# Patient Record
Sex: FEMALE | Race: WHITE | Employment: OTHER | URBAN - METROPOLITAN AREA
[De-identification: names, ages, dates, MRNs, and addresses within clinical notes are randomized per-mention and may not be internally consistent; named-entity substitution may affect disease eponyms.]

---

## 2018-11-08 ENCOUNTER — HOSPITAL ENCOUNTER (OUTPATIENT)
Dept: CARDIAC CATH/INVASIVE PROCEDURES | Age: 78
Discharge: HOME | End: 2018-11-08
Attending: INTERNAL MEDICINE | Admitting: INTERNAL MEDICINE
Payer: MEDICARE

## 2018-11-08 VITALS
HEART RATE: 63 BPM | BODY MASS INDEX: 31.49 KG/M2 | RESPIRATION RATE: 18 BRPM | HEIGHT: 56 IN | TEMPERATURE: 98.2 F | DIASTOLIC BLOOD PRESSURE: 73 MMHG | SYSTOLIC BLOOD PRESSURE: 113 MMHG | WEIGHT: 140 LBS

## 2018-11-08 LAB
ANION GAP SERPL CALCULATED.3IONS-SCNC: 10 MEQ/L (ref 7–13)
BUN BLDV-MCNC: 11 MG/DL (ref 8–23)
CALCIUM SERPL-MCNC: 9.3 MG/DL (ref 8.6–10.2)
CHLORIDE BLD-SCNC: 101 MEQ/L (ref 98–107)
CO2: 26 MEQ/L (ref 22–29)
CREAT SERPL-MCNC: 0.62 MG/DL (ref 0.5–0.9)
EKG ATRIAL RATE: 312 BPM
EKG ATRIAL RATE: 64 BPM
EKG P AXIS: 45 DEGREES
EKG P-R INTERVAL: 262 MS
EKG Q-T INTERVAL: 380 MS
EKG Q-T INTERVAL: 462 MS
EKG QRS DURATION: 80 MS
EKG QRS DURATION: 84 MS
EKG QTC CALCULATION (BAZETT): 469 MS
EKG QTC CALCULATION (BAZETT): 476 MS
EKG R AXIS: -20 DEGREES
EKG R AXIS: -21 DEGREES
EKG T AXIS: -10 DEGREES
EKG T AXIS: -23 DEGREES
EKG VENTRICULAR RATE: 64 BPM
EKG VENTRICULAR RATE: 92 BPM
GFR AFRICAN AMERICAN: >60
GFR NON-AFRICAN AMERICAN: >60
GLUCOSE BLD-MCNC: 96 MG/DL (ref 74–109)
HCT VFR BLD CALC: 45 % (ref 37–47)
HEMOGLOBIN: 15.3 G/DL (ref 12–16)
MCH RBC QN AUTO: 32.1 PG (ref 27–31.3)
MCHC RBC AUTO-ENTMCNC: 34 % (ref 33–37)
MCV RBC AUTO: 94.5 FL (ref 82–100)
PDW BLD-RTO: 12.6 % (ref 11.5–14.5)
PLATELET # BLD: 162 K/UL (ref 130–400)
POTASSIUM REFLEX MAGNESIUM: 4 MEQ/L (ref 3.5–5.1)
RBC # BLD: 4.76 M/UL (ref 4.2–5.4)
SODIUM BLD-SCNC: 137 MEQ/L (ref 132–144)
WBC # BLD: 5.2 K/UL (ref 4.8–10.8)

## 2018-11-08 PROCEDURE — 92960 CARDIOVERSION ELECTRIC EXT: CPT | Performed by: INTERNAL MEDICINE

## 2018-11-08 PROCEDURE — 93005 ELECTROCARDIOGRAM TRACING: CPT

## 2018-11-08 PROCEDURE — 6360000002 HC RX W HCPCS

## 2018-11-08 PROCEDURE — 85027 COMPLETE CBC AUTOMATED: CPT

## 2018-11-08 PROCEDURE — 2580000003 HC RX 258: Performed by: INTERNAL MEDICINE

## 2018-11-08 PROCEDURE — 80048 BASIC METABOLIC PNL TOTAL CA: CPT

## 2018-11-08 PROCEDURE — 6360000002 HC RX W HCPCS: Performed by: INTERNAL MEDICINE

## 2018-11-08 PROCEDURE — 2580000003 HC RX 258

## 2018-11-08 RX ORDER — MIDAZOLAM HYDROCHLORIDE 1 MG/ML
INJECTION INTRAMUSCULAR; INTRAVENOUS
Status: COMPLETED | OUTPATIENT
Start: 2018-11-08 | End: 2018-11-08

## 2018-11-08 RX ORDER — FLUMAZENIL 0.1 MG/ML
INJECTION, SOLUTION INTRAVENOUS
Status: DISCONTINUED
Start: 2018-11-08 | End: 2018-11-08 | Stop reason: HOSPADM

## 2018-11-08 RX ORDER — FENTANYL CITRATE 50 UG/ML
INJECTION, SOLUTION INTRAMUSCULAR; INTRAVENOUS
Status: DISCONTINUED
Start: 2018-11-08 | End: 2018-11-08 | Stop reason: HOSPADM

## 2018-11-08 RX ORDER — ASCORBIC ACID 500 MG
500 TABLET ORAL DAILY
COMMUNITY

## 2018-11-08 RX ORDER — AMIODARONE HYDROCHLORIDE 200 MG/1
200 TABLET ORAL DAILY
Qty: 30 TABLET | Refills: 3 | Status: SHIPPED | OUTPATIENT
Start: 2018-11-08 | End: 2018-11-08

## 2018-11-08 RX ORDER — MIDAZOLAM HYDROCHLORIDE 1 MG/ML
INJECTION INTRAMUSCULAR; INTRAVENOUS
Status: DISCONTINUED
Start: 2018-11-08 | End: 2018-11-08 | Stop reason: HOSPADM

## 2018-11-08 RX ORDER — SODIUM CHLORIDE 0.9 % (FLUSH) 0.9 %
10 SYRINGE (ML) INJECTION PRN
Status: DISCONTINUED | OUTPATIENT
Start: 2018-11-08 | End: 2018-11-08 | Stop reason: HOSPADM

## 2018-11-08 RX ORDER — NALOXONE HYDROCHLORIDE 0.4 MG/ML
INJECTION, SOLUTION INTRAMUSCULAR; INTRAVENOUS; SUBCUTANEOUS
Status: DISCONTINUED
Start: 2018-11-08 | End: 2018-11-08 | Stop reason: HOSPADM

## 2018-11-08 RX ORDER — SODIUM CHLORIDE 9 MG/ML
INJECTION, SOLUTION INTRAVENOUS CONTINUOUS
Status: DISCONTINUED | OUTPATIENT
Start: 2018-11-08 | End: 2018-11-08 | Stop reason: HOSPADM

## 2018-11-08 RX ORDER — AMIODARONE HYDROCHLORIDE 200 MG/1
200 TABLET ORAL DAILY
Status: DISCONTINUED | OUTPATIENT
Start: 2018-11-08 | End: 2018-11-08 | Stop reason: HOSPADM

## 2018-11-08 RX ORDER — AMIODARONE HYDROCHLORIDE 200 MG/1
200 TABLET ORAL DAILY
Qty: 30 TABLET | Refills: 3 | Status: SHIPPED | OUTPATIENT
Start: 2018-11-08 | End: 2019-03-07

## 2018-11-08 RX ORDER — SODIUM CHLORIDE 0.9 % (FLUSH) 0.9 %
10 SYRINGE (ML) INJECTION EVERY 12 HOURS SCHEDULED
Status: DISCONTINUED | OUTPATIENT
Start: 2018-11-08 | End: 2018-11-08 | Stop reason: HOSPADM

## 2018-11-08 RX ADMIN — AMIODARONE HYDROCHLORIDE 150 MG: 50 INJECTION, SOLUTION INTRAVENOUS at 11:06

## 2018-11-08 RX ADMIN — MIDAZOLAM HYDROCHLORIDE 3 MG: 1 INJECTION, SOLUTION INTRAMUSCULAR; INTRAVENOUS at 11:09

## 2018-11-08 RX ADMIN — FENTANYL CITRATE 50 MCG: 50 INJECTION, SOLUTION INTRAMUSCULAR; INTRAVENOUS at 10:53

## 2018-11-08 RX ADMIN — MIDAZOLAM HYDROCHLORIDE 3 MG: 1 INJECTION, SOLUTION INTRAMUSCULAR; INTRAVENOUS at 10:53

## 2019-03-07 ENCOUNTER — OFFICE VISIT (OUTPATIENT)
Dept: INTERNAL MEDICINE | Age: 79
End: 2019-03-07
Payer: MEDICARE

## 2019-03-07 VITALS
SYSTOLIC BLOOD PRESSURE: 100 MMHG | BODY MASS INDEX: 31.85 KG/M2 | WEIGHT: 141.6 LBS | OXYGEN SATURATION: 96 % | HEART RATE: 74 BPM | HEIGHT: 56 IN | TEMPERATURE: 98.4 F | DIASTOLIC BLOOD PRESSURE: 70 MMHG

## 2019-03-07 DIAGNOSIS — J01.90 ACUTE SINUSITIS, RECURRENCE NOT SPECIFIED, UNSPECIFIED LOCATION: Primary | ICD-10-CM

## 2019-03-07 PROCEDURE — 1101F PT FALLS ASSESS-DOCD LE1/YR: CPT | Performed by: NURSE PRACTITIONER

## 2019-03-07 PROCEDURE — G8427 DOCREV CUR MEDS BY ELIG CLIN: HCPCS | Performed by: NURSE PRACTITIONER

## 2019-03-07 PROCEDURE — 4040F PNEUMOC VAC/ADMIN/RCVD: CPT | Performed by: NURSE PRACTITIONER

## 2019-03-07 PROCEDURE — G8484 FLU IMMUNIZE NO ADMIN: HCPCS | Performed by: NURSE PRACTITIONER

## 2019-03-07 PROCEDURE — 1123F ACP DISCUSS/DSCN MKR DOCD: CPT | Performed by: NURSE PRACTITIONER

## 2019-03-07 PROCEDURE — 1090F PRES/ABSN URINE INCON ASSESS: CPT | Performed by: NURSE PRACTITIONER

## 2019-03-07 PROCEDURE — 99213 OFFICE O/P EST LOW 20 MIN: CPT | Performed by: NURSE PRACTITIONER

## 2019-03-07 PROCEDURE — G8400 PT W/DXA NO RESULTS DOC: HCPCS | Performed by: NURSE PRACTITIONER

## 2019-03-07 PROCEDURE — 1036F TOBACCO NON-USER: CPT | Performed by: NURSE PRACTITIONER

## 2019-03-07 PROCEDURE — G8417 CALC BMI ABV UP PARAM F/U: HCPCS | Performed by: NURSE PRACTITIONER

## 2019-03-07 RX ORDER — AZITHROMYCIN 250 MG/1
250 TABLET, FILM COATED ORAL SEE ADMIN INSTRUCTIONS
Qty: 6 TABLET | Refills: 0 | Status: SHIPPED | OUTPATIENT
Start: 2019-03-07 | End: 2019-03-12

## 2019-03-07 ASSESSMENT — ENCOUNTER SYMPTOMS
GASTROINTESTINAL NEGATIVE: 1
COUGH: 1
SINUS PAIN: 1
EYE PAIN: 0
EYE ITCHING: 0
SORE THROAT: 0
WHEEZING: 0
RHINORRHEA: 0
SHORTNESS OF BREATH: 0
EYE DISCHARGE: 0

## 2019-06-21 ENCOUNTER — OFFICE VISIT (OUTPATIENT)
Dept: INTERNAL MEDICINE | Age: 79
End: 2019-06-21
Payer: MEDICARE

## 2019-06-21 VITALS
DIASTOLIC BLOOD PRESSURE: 64 MMHG | WEIGHT: 144.3 LBS | BODY MASS INDEX: 32.46 KG/M2 | TEMPERATURE: 98.3 F | OXYGEN SATURATION: 95 % | HEIGHT: 56 IN | HEART RATE: 98 BPM | SYSTOLIC BLOOD PRESSURE: 98 MMHG | RESPIRATION RATE: 18 BRPM

## 2019-06-21 DIAGNOSIS — H60.392 OTHER INFECTIVE ACUTE OTITIS EXTERNA OF LEFT EAR: ICD-10-CM

## 2019-06-21 DIAGNOSIS — H66.002 NON-RECURRENT ACUTE SUPPURATIVE OTITIS MEDIA OF LEFT EAR WITHOUT SPONTANEOUS RUPTURE OF TYMPANIC MEMBRANE: Primary | ICD-10-CM

## 2019-06-21 PROCEDURE — 99213 OFFICE O/P EST LOW 20 MIN: CPT | Performed by: NURSE PRACTITIONER

## 2019-06-21 RX ORDER — CEFDINIR 300 MG/1
300 CAPSULE ORAL 2 TIMES DAILY
Qty: 14 CAPSULE | Refills: 0 | Status: SHIPPED | OUTPATIENT
Start: 2019-06-21 | End: 2019-06-28

## 2019-06-21 RX ORDER — ACETIC ACID 20.65 MG/ML
4 SOLUTION AURICULAR (OTIC) 3 TIMES DAILY
Qty: 1 BOTTLE | Refills: 0 | Status: SHIPPED | OUTPATIENT
Start: 2019-06-21 | End: 2019-06-28

## 2019-06-21 ASSESSMENT — ENCOUNTER SYMPTOMS
FACIAL SWELLING: 0
VOMITING: 0
SHORTNESS OF BREATH: 0
ABDOMINAL PAIN: 0
COUGH: 1
WHEEZING: 0
SORE THROAT: 0
DIARRHEA: 0
NAUSEA: 1

## 2019-06-21 ASSESSMENT — PATIENT HEALTH QUESTIONNAIRE - PHQ9
SUM OF ALL RESPONSES TO PHQ9 QUESTIONS 1 & 2: 0
SUM OF ALL RESPONSES TO PHQ QUESTIONS 1-9: 0
2. FEELING DOWN, DEPRESSED OR HOPELESS: 0
1. LITTLE INTEREST OR PLEASURE IN DOING THINGS: 0
SUM OF ALL RESPONSES TO PHQ QUESTIONS 1-9: 0

## 2019-07-25 ENCOUNTER — OFFICE VISIT (OUTPATIENT)
Dept: INTERNAL MEDICINE | Age: 79
End: 2019-07-25
Payer: MEDICARE

## 2019-07-25 VITALS
HEIGHT: 56 IN | TEMPERATURE: 98 F | HEART RATE: 70 BPM | DIASTOLIC BLOOD PRESSURE: 70 MMHG | OXYGEN SATURATION: 97 % | SYSTOLIC BLOOD PRESSURE: 120 MMHG | BODY MASS INDEX: 32.44 KG/M2 | WEIGHT: 144.2 LBS

## 2019-07-25 DIAGNOSIS — R09.82 ALLERGIC RHINITIS WITH POSTNASAL DRIP: Primary | ICD-10-CM

## 2019-07-25 DIAGNOSIS — J30.9 ALLERGIC RHINITIS WITH POSTNASAL DRIP: Primary | ICD-10-CM

## 2019-07-25 PROCEDURE — 99213 OFFICE O/P EST LOW 20 MIN: CPT | Performed by: NURSE PRACTITIONER

## 2019-07-25 ASSESSMENT — ENCOUNTER SYMPTOMS
VOMITING: 0
NAUSEA: 0
SINUS PRESSURE: 0
SHORTNESS OF BREATH: 0
ABDOMINAL PAIN: 0
COUGH: 1
DIARRHEA: 0
RHINORRHEA: 1
SINUS PAIN: 0
SORE THROAT: 0
WHEEZING: 0

## 2019-07-25 NOTE — PROGRESS NOTES
not erythematous and not bulging. Nose: Mucosal edema (mild) and rhinorrhea present. No sinus tenderness. Right sinus exhibits no maxillary sinus tenderness and no frontal sinus tenderness. Left sinus exhibits no maxillary sinus tenderness and no frontal sinus tenderness. Mouth/Throat: Uvula is midline, oropharynx is clear and moist and mucous membranes are normal. No tonsillar exudate. Eyes: Conjunctivae are normal.   Neck: Normal range of motion. Cardiovascular: Normal rate, regular rhythm and normal heart sounds. Pulmonary/Chest: Effort normal and breath sounds normal.   Abdominal: Soft. Bowel sounds are normal.   Musculoskeletal: Normal range of motion. Lymphadenopathy:     She has no cervical adenopathy. Neurological: She is alert and oriented to person, place, and time. Skin: Skin is warm and dry. Psychiatric: She has a normal mood and affect. Assessment:       Diagnosis Orders   1. Allergic rhinitis with postnasal drip           Plan:      Physical exam negative for infection. Advised patient on the use of Claritin and Flonase to decrease sinus related symptoms and postnasal drip. Patient declined and stated she would like to continue irrigating her nasal passages with honey that she produces at home. Advised her that this practice could be irritating to her nasal passages and potentially the cause of her ongoing symptoms. Patient verbalized understanding and will schedule appointment to follow up with PCP. Return for follow up with PCP.       Sidney Mercado, STACY - NP

## 2020-01-22 ENCOUNTER — OFFICE VISIT (OUTPATIENT)
Dept: INTERNAL MEDICINE | Age: 80
End: 2020-01-22
Payer: MEDICARE

## 2020-01-22 VITALS
BODY MASS INDEX: 32.06 KG/M2 | SYSTOLIC BLOOD PRESSURE: 110 MMHG | TEMPERATURE: 98.2 F | HEART RATE: 93 BPM | DIASTOLIC BLOOD PRESSURE: 68 MMHG | OXYGEN SATURATION: 98 % | WEIGHT: 143 LBS

## 2020-01-22 PROBLEM — M54.50 LOW BACK PAIN: Status: ACTIVE | Noted: 2020-01-22

## 2020-01-22 PROBLEM — M41.9 SCOLIOSIS: Status: ACTIVE | Noted: 2020-01-22

## 2020-01-22 LAB
APPEARANCE FLUID: ABNORMAL
BILIRUBIN, POC: ABNORMAL
BLOOD URINE, POC: ABNORMAL
CLARITY, POC: ABNORMAL
COLOR, POC: ABNORMAL
GLUCOSE URINE, POC: ABNORMAL
KETONES, POC: ABNORMAL
LEUKOCYTE EST, POC: ABNORMAL
NITRITE, POC: ABNORMAL
PH, POC: 7.5
PROTEIN, POC: ABNORMAL
SPECIFIC GRAVITY, POC: 1.01
UROBILINOGEN, POC: ABNORMAL

## 2020-01-22 PROCEDURE — G8510 SCR DEP NEG, NO PLAN REQD: HCPCS | Performed by: NURSE PRACTITIONER

## 2020-01-22 PROCEDURE — G8427 DOCREV CUR MEDS BY ELIG CLIN: HCPCS | Performed by: NURSE PRACTITIONER

## 2020-01-22 PROCEDURE — 1036F TOBACCO NON-USER: CPT | Performed by: NURSE PRACTITIONER

## 2020-01-22 PROCEDURE — G8417 CALC BMI ABV UP PARAM F/U: HCPCS | Performed by: NURSE PRACTITIONER

## 2020-01-22 PROCEDURE — 4040F PNEUMOC VAC/ADMIN/RCVD: CPT | Performed by: NURSE PRACTITIONER

## 2020-01-22 PROCEDURE — 81002 URINALYSIS NONAUTO W/O SCOPE: CPT | Performed by: NURSE PRACTITIONER

## 2020-01-22 PROCEDURE — 99213 OFFICE O/P EST LOW 20 MIN: CPT | Performed by: NURSE PRACTITIONER

## 2020-01-22 PROCEDURE — 1123F ACP DISCUSS/DSCN MKR DOCD: CPT | Performed by: NURSE PRACTITIONER

## 2020-01-22 PROCEDURE — G8484 FLU IMMUNIZE NO ADMIN: HCPCS | Performed by: NURSE PRACTITIONER

## 2020-01-22 PROCEDURE — 1090F PRES/ABSN URINE INCON ASSESS: CPT | Performed by: NURSE PRACTITIONER

## 2020-01-22 PROCEDURE — G8400 PT W/DXA NO RESULTS DOC: HCPCS | Performed by: NURSE PRACTITIONER

## 2020-01-22 RX ORDER — CIPROFLOXACIN 250 MG/1
250 TABLET, FILM COATED ORAL 2 TIMES DAILY
Qty: 6 TABLET | Refills: 0 | Status: SHIPPED | OUTPATIENT
Start: 2020-01-22 | End: 2020-01-25

## 2020-01-22 ASSESSMENT — ENCOUNTER SYMPTOMS
NAUSEA: 0
VOMITING: 0

## 2020-01-22 ASSESSMENT — PATIENT HEALTH QUESTIONNAIRE - PHQ9: DEPRESSION UNABLE TO ASSESS: URGENT/EMERGENT SITUATION

## 2020-01-23 DIAGNOSIS — R30.0 DYSURIA: ICD-10-CM

## 2020-01-23 PROBLEM — I48.0 PAROXYSMAL ATRIAL FIBRILLATION (HCC): Status: ACTIVE | Noted: 2018-10-02

## 2020-01-23 ASSESSMENT — ENCOUNTER SYMPTOMS
COUGH: 0
ABDOMINAL PAIN: 0
SHORTNESS OF BREATH: 0
WHEEZING: 0

## 2020-01-23 NOTE — PATIENT INSTRUCTIONS
from front to back. When should you call for help? Call your doctor now or seek immediate medical care if:    · Symptoms such as fever, chills, nausea, or vomiting get worse or appear for the first time.     · You have new pain in your back just below your rib cage. This is called flank pain.     · There is new blood or pus in your urine.     · You have any problems with your antibiotic medicine.    Watch closely for changes in your health, and be sure to contact your doctor if:    · You are not getting better after taking an antibiotic for 2 days.     · Your symptoms go away but then come back. Where can you learn more? Go to https://ZyncdpeTabacus Initativeeb.NeurAxon. org and sign in to your [employer] account. Enter I099 in the [employer] box to learn more about \"Urinary Tract Infection in Women: Care Instructions. \"     If you do not have an account, please click on the \"Sign Up Now\" link. Current as of: August 21, 2019  Content Version: 12.3  © 4794-1565 Healthwise, Incorporated. Care instructions adapted under license by [facility] ([facility]). If you have questions about a medical condition or this instruction, always ask your healthcare professional. [address] any warranty or liability for your use of this information.

## 2020-01-23 NOTE — PROGRESS NOTES
Subjective:      Patient ID: Bentley Blount is a 78 y.o. female who presents today for:  Chief Complaint   Patient presents with    Dysuria     frequency, urgency, x 2 days       Urinary Frequency    This is a new problem. The current episode started yesterday. The problem occurs every urination. The problem has been unchanged. The patient is experiencing no pain. There has been no fever. There is no history of pyelonephritis. Associated symptoms include frequency and urgency. Pertinent negatives include no chills, discharge, flank pain, hematuria, hesitancy, nausea, possible pregnancy, sweats or vomiting. She has tried nothing for the symptoms. Her past medical history is significant for kidney stones. There is no history of recurrent UTIs. Past Medical History:   Diagnosis Date    Low back pain 1/22/2020    Osteoporosis     TMJ syndrome      Past Surgical History:   Procedure Laterality Date    BLADDER REPAIR      HERNIA REPAIR      Left groin    HYSTERECTOMY      Vaginal for prolapse    KIDNEY SURGERY       Family History   Problem Relation Age of Onset    Cancer Mother     Stroke Father     Heart Disease Sister     High Blood Pressure Sister      Allergies   Allergen Reactions    Latex     Amoxicillin-Pot Clavulanate Rash    Codeine Hives and Rash         Review of Systems   Constitutional: Negative for chills, fatigue and fever. Respiratory: Negative for cough, shortness of breath and wheezing. Cardiovascular: Negative for chest pain. Gastrointestinal: Negative for abdominal pain, nausea and vomiting. Genitourinary: Positive for frequency and urgency. Negative for dysuria, flank pain, hematuria and hesitancy. Musculoskeletal: Negative for arthralgias and myalgias. Objective:   /68   Pulse 93   Temp 98.2 °F (36.8 °C) (Oral)   Wt 143 lb (64.9 kg)   SpO2 98%   BMI 32.06 kg/m²     Physical Exam  Vitals signs reviewed.    Constitutional:       General: She is not in prescribed today, as well as treatment plan/rationale, follow-up care, and result expectations have been discussed with the patient. Expresses understanding and desires to proceed with the treatment plan. Discussed signs and symptoms which require immediate follow-up in ED/call to 911. Understanding verbalized. I have reviewed and updated the electronic medical record. Return if symptoms worsen or fail to improve, for follow up with PCP.       Carter Holland, STACY - NP

## 2020-01-25 LAB
ORGANISM: ABNORMAL
URINE CULTURE, ROUTINE: ABNORMAL

## 2021-03-11 ENCOUNTER — OFFICE VISIT (OUTPATIENT)
Dept: INTERNAL MEDICINE | Age: 81
End: 2021-03-11
Payer: MEDICARE

## 2021-03-11 VITALS
TEMPERATURE: 97.5 F | HEIGHT: 56 IN | SYSTOLIC BLOOD PRESSURE: 124 MMHG | BODY MASS INDEX: 32.53 KG/M2 | OXYGEN SATURATION: 100 % | WEIGHT: 144.6 LBS | RESPIRATION RATE: 12 BRPM | DIASTOLIC BLOOD PRESSURE: 80 MMHG | HEART RATE: 92 BPM

## 2021-03-11 DIAGNOSIS — H66.002 NON-RECURRENT ACUTE SUPPURATIVE OTITIS MEDIA OF LEFT EAR WITHOUT SPONTANEOUS RUPTURE OF TYMPANIC MEMBRANE: Primary | ICD-10-CM

## 2021-03-11 PROCEDURE — 4040F PNEUMOC VAC/ADMIN/RCVD: CPT | Performed by: NURSE PRACTITIONER

## 2021-03-11 PROCEDURE — G8484 FLU IMMUNIZE NO ADMIN: HCPCS | Performed by: NURSE PRACTITIONER

## 2021-03-11 PROCEDURE — 1123F ACP DISCUSS/DSCN MKR DOCD: CPT | Performed by: NURSE PRACTITIONER

## 2021-03-11 PROCEDURE — 1090F PRES/ABSN URINE INCON ASSESS: CPT | Performed by: NURSE PRACTITIONER

## 2021-03-11 PROCEDURE — G8427 DOCREV CUR MEDS BY ELIG CLIN: HCPCS | Performed by: NURSE PRACTITIONER

## 2021-03-11 PROCEDURE — G8400 PT W/DXA NO RESULTS DOC: HCPCS | Performed by: NURSE PRACTITIONER

## 2021-03-11 PROCEDURE — G8419 CALC BMI OUT NRM PARAM NOF/U: HCPCS | Performed by: NURSE PRACTITIONER

## 2021-03-11 PROCEDURE — 99213 OFFICE O/P EST LOW 20 MIN: CPT | Performed by: NURSE PRACTITIONER

## 2021-03-11 PROCEDURE — 1036F TOBACCO NON-USER: CPT | Performed by: NURSE PRACTITIONER

## 2021-03-11 RX ORDER — CEFDINIR 300 MG/1
300 CAPSULE ORAL 2 TIMES DAILY
Qty: 14 CAPSULE | Refills: 0 | Status: SHIPPED | OUTPATIENT
Start: 2021-03-11 | End: 2021-03-18

## 2021-03-11 SDOH — ECONOMIC STABILITY: TRANSPORTATION INSECURITY
IN THE PAST 12 MONTHS, HAS THE LACK OF TRANSPORTATION KEPT YOU FROM MEDICAL APPOINTMENTS OR FROM GETTING MEDICATIONS?: NOT ASKED

## 2021-03-11 SDOH — ECONOMIC STABILITY: FOOD INSECURITY: WITHIN THE PAST 12 MONTHS, THE FOOD YOU BOUGHT JUST DIDN'T LAST AND YOU DIDN'T HAVE MONEY TO GET MORE.: NEVER TRUE

## 2021-03-11 ASSESSMENT — PATIENT HEALTH QUESTIONNAIRE - PHQ9
2. FEELING DOWN, DEPRESSED OR HOPELESS: 0
SUM OF ALL RESPONSES TO PHQ9 QUESTIONS 1 & 2: 0
SUM OF ALL RESPONSES TO PHQ QUESTIONS 1-9: 0

## 2021-03-11 ASSESSMENT — ENCOUNTER SYMPTOMS
SINUS PRESSURE: 0
RHINORRHEA: 0
SINUS PAIN: 0
SHORTNESS OF BREATH: 0
COUGH: 0
WHEEZING: 0
SORE THROAT: 0

## 2021-03-11 NOTE — PATIENT INSTRUCTIONS
Patient Education        Ear Infection (Otitis Media): Care Instructions  Overview     An ear infection may start with a cold and affect the middle ear (otitis media). It can hurt a lot. Most ear infections clear up on their own in a couple of days and do not need antibiotics. Also, antibiotics do not work against viruses, which may be the cause of your infection. Regular doses of pain relievers are the best way to reduce your fever and help you feel better. Follow-up care is a key part of your treatment and safety. Be sure to make and go to all appointments, and call your doctor if you are having problems. It's also a good idea to know your test results and keep a list of the medicines you take. How can you care for yourself at home? · Take pain medicines exactly as directed. ? If the doctor gave you a prescription medicine for pain, take it as prescribed. ? If you are not taking a prescription pain medicine, take an over-the-counter medicine, such as acetaminophen (Tylenol), ibuprofen (Advil, Motrin), or naproxen (Aleve). Read and follow all instructions on the label. ? Do not take two or more pain medicines at the same time unless the doctor told you to. Many pain medicines have acetaminophen, which is Tylenol. Too much acetaminophen (Tylenol) can be harmful. · Plan to take a full dose of pain reliever before bedtime. Getting enough sleep will help you get better. · Try a warm, moist washcloth on the ear. It may help relieve pain. · If your doctor prescribed antibiotics, take them as directed. Do not stop taking them just because you feel better. You need to take the full course of antibiotics. When should you call for help? Call your doctor now or seek immediate medical care if:    · You have new or increasing ear pain.     · You have new or increasing pus or blood draining from your ear.     · You have a fever with a stiff neck or a severe headache.    Watch closely for changes in your health, and be sure to contact your doctor if:    · You have new or worse symptoms.     · You are not getting better after taking an antibiotic for 2 days. Where can you learn more? Go to https://NaniganspeAttention Sciences.Bilbus. org and sign in to your [employer] account. Enter O928 in the [facility] box to learn more about \"Ear Infection (Otitis Media): Care Instructions. \"     If you do not have an account, please click on the \"Sign Up Now\" link. Current as of: December 2, 2020               Content Version: 12.8  © 6789-0809 Healthwise, Incorporated. Care instructions adapted under license by [facility] ([facility]). If you have questions about a medical condition or this instruction, always ask your healthcare professional. [address] any warranty or liability for your use of this information.

## 2021-03-11 NOTE — PROGRESS NOTES
Subjective:      Patient ID: Claude Grumbles is a [de-identified] y.o. female who presents today for:  Chief Complaint   Patient presents with   [place]     left side, into left jaw, causing headache       Otalgia   There is pain in the left ear. This is a new problem. Episode onset: 4 days ago. The problem occurs constantly. The problem has been unchanged. There has been no fever. The pain is at a severity of 8/10. The pain is severe. Pertinent negatives include no coughing, ear discharge, headaches, hearing loss, rhinorrhea or sore throat. Treatments tried: \"homeopathic home remedies\" The treatment provided mild relief. There is no history of a chronic ear infection, hearing loss or a tympanostomy tube. Past Medical History:   Diagnosis Date    Low back pain 1/22/2020    Osteoporosis     TMJ syndrome      Past Surgical History:   Procedure Laterality Date    BLADDER REPAIR      HERNIA REPAIR      Left groin    HYSTERECTOMY      Vaginal for prolapse    KIDNEY SURGERY       Family History   Problem Relation Age of Onset    Cancer Mother     Stroke Father     Heart Disease Sister     High Blood Pressure Sister      Allergies   Allergen Reactions    Latex     Amoxicillin-Pot Clavulanate Rash    Codeine Hives and Rash         Review of Systems   Constitutional: Negative for chills, fatigue and fever. HENT: Positive for ear pain. Negative for congestion, ear discharge, hearing loss, postnasal drip, rhinorrhea, sinus pressure, sinus pain and sore throat. Respiratory: Negative for cough, shortness of breath and wheezing. Cardiovascular: Negative for chest pain. Neurological: Negative for headaches. Objective:   /80   Pulse 92   Temp 97.5 °F (36.4 °C) (Infrared)   Resp 12   Ht 4' 8\" (1.422 m)   Wt 144 lb 9.6 oz (65.6 kg)   SpO2 100%   BMI 32.42 kg/m²     Physical Exam  Vitals signs reviewed. Constitutional:       General: She is not in acute distress.      Appearance: She is well-developed. She is not ill-appearing. HENT:      Head: Normocephalic. Right Ear: Ear canal and external ear normal. Tympanic membrane is erythematous and bulging. Left Ear: Tympanic membrane, ear canal and external ear normal.   Neck:      Musculoskeletal: Normal range of motion. Cardiovascular:      Rate and Rhythm: Normal rate and regular rhythm. Heart sounds: Normal heart sounds. Pulmonary:      Effort: Pulmonary effort is normal. No respiratory distress. Breath sounds: Normal breath sounds. Musculoskeletal: Normal range of motion. Lymphadenopathy:      Cervical: No cervical adenopathy. Skin:     General: Skin is warm and dry. Neurological:      Mental Status: She is alert and oriented to person, place, and time. Assessment:       Diagnosis Orders   1. Non-recurrent acute suppurative otitis media of left ear without spontaneous rupture of tympanic membrane  cefdinir (OMNICEF) 300 MG capsule         Plan:      No orders of the defined types were placed in this encounter. Orders Placed This Encounter   Medications    cefdinir (OMNICEF) 300 MG capsule     Sig: Take 1 capsule by mouth 2 times daily for 7 days     Dispense:  14 capsule     Refill:  0     Reviewed usual course of illness and supportive measures for symptom management. Medication administration and side effects were discussed. Advised close monitoring and follow up for new or worsening symptoms. Patient verbalizes understanding and wishes to proceed with the treatment plan. I have reviewed and updated the electronic medical record. Return if symptoms worsen or fail to improve, for follow up with PCP.     STACY House NP

## 2023-03-07 LAB
ANION GAP IN SER/PLAS: 10 MMOL/L (ref 10–20)
ANION GAP SERPL CALCULATED.3IONS-SCNC: 10 MMOL/L (ref 10–20)
BICARBONATE: 30 MMOL/L (ref 21–32)
CALCIUM (MG/DL) IN SER/PLAS: 9.2 MG/DL (ref 8.6–10.3)
CALCIUM SERPL-MCNC: 9.2 MG/DL (ref 8.6–10.3)
CARBON DIOXIDE, TOTAL (MMOL/L) IN SER/PLAS: 30 MMOL/L (ref 21–32)
CHLORIDE (MMOL/L) IN SER/PLAS: 104 MMOL/L (ref 98–107)
CHLORIDE BLD-SCNC: 104 MMOL/L (ref 98–107)
CREAT SERPL-MCNC: 0.68 MG/DL (ref 0.5–1)
CREATININE (MG/DL) IN SER/PLAS: 0.68 MG/DL (ref 0.5–1.05)
EGFR FEMALE: 87 ML/MIN/1.73M2
ERYTHROCYTE DISTRIBUTION WIDTH (RATIO) BY AUTOMATED COUNT: 12.3 % (ref 11.5–14.5)
ERYTHROCYTE MEAN CORPUSCULAR HEMOGLOBIN CONCENTRATION (G/DL) BY AUTOMATED: 32.6 G/DL (ref 32–36)
ERYTHROCYTE MEAN CORPUSCULAR VOLUME (FL) BY AUTOMATED COUNT: 99 FL (ref 80–100)
ERYTHROCYTE [DISTWIDTH] IN BLOOD BY AUTOMATED COUNT: 12.3 % (ref 11.5–14)
ERYTHROCYTES (10*6/UL) IN BLOOD BY AUTOMATED COUNT: 4.41 X10E12/L (ref 4–5.2)
GFR FEMALE: 87 ML/MIN/1.73M2
GLUCOSE (MG/DL) IN SER/PLAS: 112 MG/DL (ref 74–99)
GLUCOSE: 112 MG/DL (ref 74–99)
HCT VFR BLD CALC: 43.6 % (ref 36–46)
HEMATOCRIT (%) IN BLOOD BY AUTOMATED COUNT: 43.6 % (ref 36–46)
HEMOGLOBIN (G/DL) IN BLOOD: 14.2 G/DL (ref 12–16)
HEMOGLOBIN: 14.2 G/DL (ref 12–16)
LEUKOCYTES (10*3/UL) IN BLOOD BY AUTOMATED COUNT: 7.2 X10E9/L (ref 4.4–11.3)
MCHC RBC AUTO-ENTMCNC: 32.6 G/DL (ref 32–36)
MCV RBC AUTO: 99 FL (ref 80–100)
PLATELET # BLD: 184 X10E9/L (ref 150–450)
PLATELETS (10*3/UL) IN BLOOD AUTOMATED COUNT: 184 X10E9/L (ref 150–450)
POTASSIUM (MMOL/L) IN SER/PLAS: 4.1 MMOL/L (ref 3.5–5.3)
POTASSIUM SERPL-SCNC: 4.1 MMOL/L (ref 3.5–5.3)
RBC # BLD: 4.41 X10E12/L (ref 4–5.2)
SODIUM (MMOL/L) IN SER/PLAS: 140 MMOL/L (ref 136–145)
SODIUM BLD-SCNC: 140 MMOL/L (ref 136–145)
UREA NITROGEN (MG/DL) IN SER/PLAS: 9 MG/DL (ref 6–23)
UREA NITROGEN: 9 MG/DL (ref 6–23)
WBC: 7.2 X10E9/L (ref 4.4–11.3)

## 2023-08-16 RX ORDER — ASCORBIC ACID 500 MG
1 TABLET ORAL DAILY
COMMUNITY
End: 2023-10-27 | Stop reason: ALTCHOICE

## 2023-08-16 RX ORDER — TIZANIDINE 4 MG/1
1 TABLET ORAL NIGHTLY PRN
COMMUNITY
End: 2023-10-27 | Stop reason: ALTCHOICE

## 2023-08-16 RX ORDER — ACETAMINOPHEN 325 MG/1
3 TABLET ORAL EVERY 8 HOURS
COMMUNITY
Start: 2021-12-14

## 2023-08-16 RX ORDER — APIXABAN 2.5 MG/1
1 TABLET, FILM COATED ORAL 2 TIMES DAILY
COMMUNITY
Start: 2022-09-28 | End: 2023-10-27 | Stop reason: SDUPTHER

## 2023-08-16 RX ORDER — METOPROLOL SUCCINATE 25 MG/1
0.5 TABLET, EXTENDED RELEASE ORAL 2 TIMES DAILY
COMMUNITY
Start: 2020-06-09 | End: 2023-11-01 | Stop reason: SDUPTHER

## 2023-08-16 RX ORDER — AMOXICILLIN 875 MG/1
1 TABLET, FILM COATED ORAL EVERY 12 HOURS
COMMUNITY
Start: 2022-06-28 | End: 2023-10-27 | Stop reason: ALTCHOICE

## 2023-08-18 PROBLEM — Z96.652 STATUS POST LEFT KNEE REPLACEMENT: Status: ACTIVE | Noted: 2023-08-18

## 2023-08-18 PROBLEM — M17.12 LEFT KNEE DJD: Status: ACTIVE | Noted: 2023-08-18

## 2023-08-18 PROBLEM — I49.9 IRREGULAR HEART BEAT: Status: ACTIVE | Noted: 2023-08-18

## 2023-08-18 PROBLEM — M54.2 NECK PAIN: Status: ACTIVE | Noted: 2023-08-18

## 2023-08-18 PROBLEM — M54.50 LUMBAR PAIN: Status: ACTIVE | Noted: 2023-08-18

## 2023-08-18 PROBLEM — K40.90 INGUINAL HERNIA: Status: ACTIVE | Noted: 2023-08-18

## 2023-08-18 PROBLEM — H60.92 OTITIS EXTERNA, LEFT: Status: ACTIVE | Noted: 2023-08-18

## 2023-08-18 PROBLEM — R11.0 NAUSEA IN ADULT: Status: ACTIVE | Noted: 2023-08-18

## 2023-08-18 PROBLEM — I48.91 ATRIAL FIBRILLATION (MULTI): Status: ACTIVE | Noted: 2023-08-18

## 2023-08-18 PROBLEM — M54.9 ACUTE MID BACK PAIN: Status: ACTIVE | Noted: 2023-08-18

## 2023-08-18 PROBLEM — M41.9 THORACIC SCOLIOSIS: Status: ACTIVE | Noted: 2023-08-18

## 2023-08-18 PROBLEM — I89.0 LYMPHEDEMA OF EXTREMITY: Status: ACTIVE | Noted: 2023-08-18

## 2023-08-18 PROBLEM — R53.83 FATIGUE: Status: ACTIVE | Noted: 2023-08-18

## 2023-08-18 PROBLEM — I48.21 PERMANENT ATRIAL FIBRILLATION (MULTI): Status: ACTIVE | Noted: 2023-08-18

## 2023-08-18 PROBLEM — Z79.01 ANTICOAGULANT LONG-TERM USE: Status: ACTIVE | Noted: 2023-08-18

## 2023-08-18 PROBLEM — R51.9 HEADACHE: Status: ACTIVE | Noted: 2023-08-18

## 2023-08-18 PROBLEM — R00.0 TACHYCARDIA: Status: ACTIVE | Noted: 2023-08-18

## 2023-08-18 PROBLEM — J32.9 SINUSITIS: Status: ACTIVE | Noted: 2023-08-18

## 2023-08-18 PROBLEM — I48.0 PAROXYSMAL ATRIAL FIBRILLATION (MULTI): Status: ACTIVE | Noted: 2023-08-18

## 2023-08-18 PROBLEM — M25.562 PAIN IN LEFT KNEE: Status: ACTIVE | Noted: 2023-08-18

## 2023-08-18 PROBLEM — N39.0 UTI (URINARY TRACT INFECTION): Status: ACTIVE | Noted: 2023-08-18

## 2023-08-18 PROBLEM — R06.02 SHORTNESS OF BREATH: Status: ACTIVE | Noted: 2023-08-18

## 2023-08-18 PROBLEM — E66.9 OBESE: Status: ACTIVE | Noted: 2023-08-18

## 2023-08-18 PROBLEM — M41.9 KYPHOSCOLIOSIS: Status: ACTIVE | Noted: 2023-08-18

## 2023-08-26 PROBLEM — E66.811 CLASS 1 OBESITY WITH BODY MASS INDEX (BMI) OF 31.0 TO 31.9 IN ADULT: Status: ACTIVE | Noted: 2023-08-26

## 2023-08-26 PROBLEM — I48.21 PERMANENT ATRIAL FIBRILLATION (MULTI): Status: RESOLVED | Noted: 2023-08-18 | Resolved: 2023-08-26

## 2023-08-26 PROBLEM — N39.0 UTI (URINARY TRACT INFECTION): Status: RESOLVED | Noted: 2023-08-18 | Resolved: 2023-08-26

## 2023-08-26 PROBLEM — E66.9 CLASS 1 OBESITY WITH BODY MASS INDEX (BMI) OF 31.0 TO 31.9 IN ADULT: Status: ACTIVE | Noted: 2023-08-26

## 2023-09-06 ENCOUNTER — LAB (OUTPATIENT)
Dept: LAB | Facility: LAB | Age: 83
End: 2023-09-06
Payer: MEDICARE

## 2023-09-06 ENCOUNTER — TELEPHONE (OUTPATIENT)
Dept: PRIMARY CARE | Facility: CLINIC | Age: 83
End: 2023-09-06
Payer: MEDICARE

## 2023-09-06 DIAGNOSIS — D50.9 IRON DEFICIENCY ANEMIA, UNSPECIFIED IRON DEFICIENCY ANEMIA TYPE: ICD-10-CM

## 2023-09-06 DIAGNOSIS — R53.83 FATIGUE, UNSPECIFIED TYPE: ICD-10-CM

## 2023-09-06 DIAGNOSIS — R35.0 INCREASED URINARY FREQUENCY: Primary | ICD-10-CM

## 2023-09-06 DIAGNOSIS — R35.0 INCREASED URINARY FREQUENCY: ICD-10-CM

## 2023-09-06 DIAGNOSIS — Z92.29 HX OF LONG TERM USE OF BLOOD THINNERS: ICD-10-CM

## 2023-09-06 DIAGNOSIS — R31.9 HEMATURIA, UNSPECIFIED TYPE: ICD-10-CM

## 2023-09-06 LAB
ALANINE AMINOTRANSFERASE (SGPT) (U/L) IN SER/PLAS: 12 U/L (ref 7–45)
ALBUMIN (G/DL) IN SER/PLAS: 4.5 G/DL (ref 3.4–5)
ALKALINE PHOSPHATASE (U/L) IN SER/PLAS: 64 U/L (ref 33–136)
ANION GAP IN SER/PLAS: 12 MMOL/L (ref 10–20)
APPEARANCE, URINE: CLEAR
ASPARTATE AMINOTRANSFERASE (SGOT) (U/L) IN SER/PLAS: 16 U/L (ref 9–39)
BASOPHILS (10*3/UL) IN BLOOD BY AUTOMATED COUNT: 0.04 X10E9/L (ref 0–0.1)
BASOPHILS/100 LEUKOCYTES IN BLOOD BY AUTOMATED COUNT: 0.6 % (ref 0–2)
BILIRUBIN TOTAL (MG/DL) IN SER/PLAS: 0.8 MG/DL (ref 0–1.2)
BILIRUBIN, URINE: NEGATIVE
BLOOD, URINE: NEGATIVE
CALCIUM (MG/DL) IN SER/PLAS: 9.4 MG/DL (ref 8.6–10.3)
CARBON DIOXIDE, TOTAL (MMOL/L) IN SER/PLAS: 30 MMOL/L (ref 21–32)
CHLORIDE (MMOL/L) IN SER/PLAS: 104 MMOL/L (ref 98–107)
COBALAMIN (VITAMIN B12) (PG/ML) IN SER/PLAS: 256 PG/ML (ref 211–911)
COLOR, URINE: YELLOW
CREATININE (MG/DL) IN SER/PLAS: 0.71 MG/DL (ref 0.5–1.05)
EOSINOPHILS (10*3/UL) IN BLOOD BY AUTOMATED COUNT: 0.17 X10E9/L (ref 0–0.4)
EOSINOPHILS/100 LEUKOCYTES IN BLOOD BY AUTOMATED COUNT: 2.4 % (ref 0–6)
ERYTHROCYTE DISTRIBUTION WIDTH (RATIO) BY AUTOMATED COUNT: 13 % (ref 11.5–14.5)
ERYTHROCYTE MEAN CORPUSCULAR HEMOGLOBIN CONCENTRATION (G/DL) BY AUTOMATED: 32.8 G/DL (ref 32–36)
ERYTHROCYTE MEAN CORPUSCULAR VOLUME (FL) BY AUTOMATED COUNT: 100 FL (ref 80–100)
ERYTHROCYTES (10*6/UL) IN BLOOD BY AUTOMATED COUNT: 4.38 X10E12/L (ref 4–5.2)
GFR FEMALE: 84 ML/MIN/1.73M2
GLUCOSE (MG/DL) IN SER/PLAS: 92 MG/DL (ref 74–99)
GLUCOSE, URINE: NEGATIVE MG/DL
HEMATOCRIT (%) IN BLOOD BY AUTOMATED COUNT: 43.6 % (ref 36–46)
HEMOGLOBIN (G/DL) IN BLOOD: 14.3 G/DL (ref 12–16)
IMMATURE GRANULOCYTES/100 LEUKOCYTES IN BLOOD BY AUTOMATED COUNT: 0.3 % (ref 0–0.9)
IRON (UG/DL) IN SER/PLAS: 126 UG/DL (ref 35–150)
IRON BINDING CAPACITY (UG/DL) IN SER/PLAS: 350 UG/DL (ref 240–445)
IRON SATURATION (%) IN SER/PLAS: 36 % (ref 25–45)
KETONES, URINE: NEGATIVE MG/DL
LEUKOCYTE ESTERASE, URINE: ABNORMAL
LEUKOCYTES (10*3/UL) IN BLOOD BY AUTOMATED COUNT: 7 X10E9/L (ref 4.4–11.3)
LYMPHOCYTES (10*3/UL) IN BLOOD BY AUTOMATED COUNT: 1.74 X10E9/L (ref 0.8–3)
LYMPHOCYTES/100 LEUKOCYTES IN BLOOD BY AUTOMATED COUNT: 25 % (ref 13–44)
MONOCYTES (10*3/UL) IN BLOOD BY AUTOMATED COUNT: 0.52 X10E9/L (ref 0.05–0.8)
MONOCYTES/100 LEUKOCYTES IN BLOOD BY AUTOMATED COUNT: 7.5 % (ref 2–10)
NEUTROPHILS (10*3/UL) IN BLOOD BY AUTOMATED COUNT: 4.48 X10E9/L (ref 1.6–5.5)
NEUTROPHILS/100 LEUKOCYTES IN BLOOD BY AUTOMATED COUNT: 64.2 % (ref 40–80)
NITRITE, URINE: NEGATIVE
PH, URINE: 7 (ref 5–8)
PLATELETS (10*3/UL) IN BLOOD AUTOMATED COUNT: 174 X10E9/L (ref 150–450)
POTASSIUM (MMOL/L) IN SER/PLAS: 3.8 MMOL/L (ref 3.5–5.3)
PROTEIN TOTAL: 7 G/DL (ref 6.4–8.2)
PROTEIN, URINE: NEGATIVE MG/DL
RBC, URINE: 1 /HPF (ref 0–5)
SODIUM (MMOL/L) IN SER/PLAS: 142 MMOL/L (ref 136–145)
SPECIFIC GRAVITY, URINE: 1.02 (ref 1–1.03)
SQUAMOUS EPITHELIAL CELLS, URINE: 4 /HPF
THYROTROPIN (MIU/L) IN SER/PLAS BY DETECTION LIMIT <= 0.05 MIU/L: 1.45 MIU/L (ref 0.44–3.98)
UREA NITROGEN (MG/DL) IN SER/PLAS: 10 MG/DL (ref 6–23)
UROBILINOGEN, URINE: 4 MG/DL (ref 0–1.9)
WBC, URINE: 4 /HPF (ref 0–5)

## 2023-09-06 PROCEDURE — 84436 ASSAY OF TOTAL THYROXINE: CPT

## 2023-09-06 PROCEDURE — 83540 ASSAY OF IRON: CPT

## 2023-09-06 PROCEDURE — 87086 URINE CULTURE/COLONY COUNT: CPT

## 2023-09-06 PROCEDURE — 83550 IRON BINDING TEST: CPT

## 2023-09-06 PROCEDURE — 81001 URINALYSIS AUTO W/SCOPE: CPT

## 2023-09-06 PROCEDURE — 36415 COLL VENOUS BLD VENIPUNCTURE: CPT

## 2023-09-06 PROCEDURE — 84443 ASSAY THYROID STIM HORMONE: CPT

## 2023-09-06 PROCEDURE — 82607 VITAMIN B-12: CPT

## 2023-09-06 PROCEDURE — 80053 COMPREHEN METABOLIC PANEL: CPT

## 2023-09-06 PROCEDURE — 85025 COMPLETE CBC W/AUTO DIFF WBC: CPT

## 2023-09-06 PROCEDURE — 82728 ASSAY OF FERRITIN: CPT

## 2023-09-06 NOTE — TELEPHONE ENCOUNTER
Daughter called and stated that Malathi has had blood in her urine and is very fatigue.  Please advise

## 2023-09-07 ENCOUNTER — TELEPHONE (OUTPATIENT)
Dept: PRIMARY CARE | Facility: CLINIC | Age: 83
End: 2023-09-07
Payer: MEDICARE

## 2023-09-07 DIAGNOSIS — N39.0 URINARY TRACT INFECTION WITHOUT HEMATURIA, SITE UNSPECIFIED: Primary | ICD-10-CM

## 2023-09-07 LAB
FERRITIN (UG/LL) IN SER/PLAS: 147 UG/L (ref 8–150)
THYROXINE (T4) (UG/DL) IN SER/PLAS: 8.8 UG/DL (ref 4.5–11.1)
URINE CULTURE: NORMAL

## 2023-09-07 RX ORDER — NITROFURANTOIN 25; 75 MG/1; MG/1
100 CAPSULE ORAL 2 TIMES DAILY
Qty: 14 CAPSULE | Refills: 0 | Status: SHIPPED | OUTPATIENT
Start: 2023-09-07 | End: 2023-09-14

## 2023-10-04 ENCOUNTER — APPOINTMENT (OUTPATIENT)
Dept: CARDIOLOGY | Facility: CLINIC | Age: 83
End: 2023-10-04
Payer: MEDICARE

## 2023-10-27 ENCOUNTER — OFFICE VISIT (OUTPATIENT)
Dept: PRIMARY CARE | Facility: CLINIC | Age: 83
End: 2023-10-27
Payer: MEDICARE

## 2023-10-27 ENCOUNTER — TELEPHONE (OUTPATIENT)
Dept: PRIMARY CARE | Facility: CLINIC | Age: 83
End: 2023-10-27

## 2023-10-27 VITALS
SYSTOLIC BLOOD PRESSURE: 118 MMHG | DIASTOLIC BLOOD PRESSURE: 78 MMHG | TEMPERATURE: 97.3 F | HEART RATE: 86 BPM | HEIGHT: 56 IN | RESPIRATION RATE: 19 BRPM | WEIGHT: 141.7 LBS | BODY MASS INDEX: 31.87 KG/M2

## 2023-10-27 DIAGNOSIS — Z00.00 WELL ADULT EXAM: ICD-10-CM

## 2023-10-27 DIAGNOSIS — M65.30 TRIGGER FINGER OF LEFT HAND, UNSPECIFIED FINGER: ICD-10-CM

## 2023-10-27 DIAGNOSIS — I48.91 ATRIAL FIBRILLATION, UNSPECIFIED TYPE (MULTI): Primary | ICD-10-CM

## 2023-10-27 DIAGNOSIS — I48.0 PAROXYSMAL ATRIAL FIBRILLATION (MULTI): ICD-10-CM

## 2023-10-27 DIAGNOSIS — Z00.00 MEDICARE ANNUAL WELLNESS VISIT, SUBSEQUENT: Primary | ICD-10-CM

## 2023-10-27 PROCEDURE — G0439 PPPS, SUBSEQ VISIT: HCPCS | Performed by: FAMILY MEDICINE

## 2023-10-27 PROCEDURE — 99214 OFFICE O/P EST MOD 30 MIN: CPT | Performed by: FAMILY MEDICINE

## 2023-10-27 RX ORDER — APIXABAN 2.5 MG/1
2.5 TABLET, FILM COATED ORAL DAILY
Qty: 30 TABLET | Refills: 3 | Status: SHIPPED | OUTPATIENT
Start: 2023-10-27 | End: 2023-10-31 | Stop reason: SDUPTHER

## 2023-10-27 ASSESSMENT — ACTIVITIES OF DAILY LIVING (ADL)
MANAGING_FINANCES: INDEPENDENT
TAKING_MEDICATION: INDEPENDENT
DOING_HOUSEWORK: INDEPENDENT
BATHING: INDEPENDENT
DRESSING: INDEPENDENT
GROCERY_SHOPPING: INDEPENDENT

## 2023-10-27 ASSESSMENT — PATIENT HEALTH QUESTIONNAIRE - PHQ9
1. LITTLE INTEREST OR PLEASURE IN DOING THINGS: NOT AT ALL
SUM OF ALL RESPONSES TO PHQ9 QUESTIONS 1 AND 2: 0
2. FEELING DOWN, DEPRESSED OR HOPELESS: NOT AT ALL

## 2023-10-27 NOTE — PROGRESS NOTES
"Subjective   Reason for Visit: Malathi Reyes is an 83 y.o. female here for a Medicare Wellness visit.     Past Medical, Surgical, and Family History reviewed and updated in chart.    Reviewed all medications by prescribing practitioner or clinical pharmacist (such as prescriptions, OTCs, herbal therapies and supplements) and documented in the medical record.    HPI    Patient Care Team:  Satnam Sharif DO as PCP - General  Satnam Sharif DO as PCP - MSSP ACO Attributed Provider     Review of Systems   Constitutional: Negative.  Negative for fatigue.   HENT: Negative.     Eyes: Negative.    Respiratory: Negative.  Negative for chest tightness and shortness of breath.    Cardiovascular: Negative.  Negative for chest pain.   Gastrointestinal: Negative.    Endocrine: Negative.    Genitourinary: Negative.    Musculoskeletal: Negative.    Skin: Negative.    Allergic/Immunologic: Negative.    Neurological: Negative.  Negative for dizziness, weakness and headaches.   Hematological: Negative.  Does not bruise/bleed easily.   Psychiatric/Behavioral: Negative.         Objective   Vitals:  /78   Pulse 86   Temp 36.3 °C (97.3 °F)   Resp 19   Ht 1.422 m (4' 8\")   Wt 64.3 kg (141 lb 11.2 oz)   BMI 31.77 kg/m²       Physical Exam  Vitals and nursing note reviewed.   Constitutional:       Appearance: Normal appearance.   HENT:      Head: Normocephalic and atraumatic.      Nose: Nose normal.      Mouth/Throat:      Pharynx: Oropharynx is clear.   Eyes:      Extraocular Movements: Extraocular movements intact.      Conjunctiva/sclera: Conjunctivae normal.      Pupils: Pupils are equal, round, and reactive to light.   Neck:      Vascular: No carotid bruit.   Cardiovascular:      Rate and Rhythm: Normal rate. Rhythm regularly irregular.      Pulses: Normal pulses.      Heart sounds: Normal heart sounds.   Pulmonary:      Effort: Pulmonary effort is normal. No respiratory distress.      Breath sounds: Normal breath " sounds. No wheezing, rhonchi or rales.   Abdominal:      General: Abdomen is flat. Bowel sounds are normal. There is no distension.      Palpations: Abdomen is soft.      Tenderness: There is no abdominal tenderness.      Hernia: No hernia is present.   Musculoskeletal:         General: No swelling. Normal range of motion.        Arms:       Cervical back: Normal range of motion and neck supple. No tenderness.      Thoracic back: Tenderness present.      Lumbar back: Tenderness present.      Comments: Trigger finger   Lymphadenopathy:      Cervical: No cervical adenopathy.   Skin:     General: Skin is warm and dry.      Capillary Refill: Capillary refill takes less than 2 seconds.   Neurological:      General: No focal deficit present.      Mental Status: She is alert and oriented to person, place, and time.      Cranial Nerves: No cranial nerve deficit.   Psychiatric:         Attention and Perception: Attention and perception normal.         Mood and Affect: Mood normal.         Behavior: Behavior normal.         Thought Content: Thought content normal.         Judgment: Judgment normal.       Assessment/Plan   Problem List Items Addressed This Visit    None

## 2023-10-29 ASSESSMENT — ENCOUNTER SYMPTOMS
CONSTITUTIONAL NEGATIVE: 1
HEADACHES: 0
FATIGUE: 0
WEAKNESS: 0
NEUROLOGICAL NEGATIVE: 1
BRUISES/BLEEDS EASILY: 0
MUSCULOSKELETAL NEGATIVE: 1
HEMATOLOGIC/LYMPHATIC NEGATIVE: 1
DIZZINESS: 0
ALLERGIC/IMMUNOLOGIC NEGATIVE: 1
GASTROINTESTINAL NEGATIVE: 1
SHORTNESS OF BREATH: 0
CHEST TIGHTNESS: 0
EYES NEGATIVE: 1
ENDOCRINE NEGATIVE: 1
PSYCHIATRIC NEGATIVE: 1
CARDIOVASCULAR NEGATIVE: 1
RESPIRATORY NEGATIVE: 1

## 2023-10-31 ENCOUNTER — TELEMEDICINE (OUTPATIENT)
Dept: PHARMACY | Facility: HOSPITAL | Age: 83
End: 2023-10-31
Payer: MEDICARE

## 2023-10-31 DIAGNOSIS — I48.0 PAROXYSMAL ATRIAL FIBRILLATION (MULTI): ICD-10-CM

## 2023-10-31 DIAGNOSIS — I48.91 ATRIAL FIBRILLATION, UNSPECIFIED TYPE (MULTI): ICD-10-CM

## 2023-10-31 RX ORDER — APIXABAN 2.5 MG/1
2.5 TABLET, FILM COATED ORAL DAILY
Qty: 90 TABLET | Refills: 3 | Status: SHIPPED | OUTPATIENT
Start: 2023-10-31 | End: 2023-11-01 | Stop reason: SDUPTHER

## 2023-10-31 NOTE — PROGRESS NOTES
"Subjective   Patient ID: Malathi Reyes is a 83 y.o. female who presents for Cost concerns with eliquis.    Referring Provider: Satnam Sharif, DO [place]  Patient and family would like to look into cost assistance for Eliquis. Referred by PCP for evaluation.    Review of Systems    Objective     Labs  Lab Results   Component Value Date    BILITOT 0.8 09/06/2023    CALCIUM 9.4 09/06/2023    CO2 30 09/06/2023     09/06/2023    CREATININE 0.71 09/06/2023    GLUCOSE 92 09/06/2023    ALKPHOS 64 09/06/2023    K 3.8 09/06/2023    PROT 7.0 09/06/2023     09/06/2023    AST 16 09/06/2023    ALT 12 09/06/2023    BUN 10 09/06/2023    ANIONGAP 12 09/06/2023    MG 2.26 09/26/2018    ALBUMIN 4.5 09/06/2023    AMYLASE 45 04/22/2020    LIPASE 20 04/22/2020    GFRF 84 09/06/2023     Lab Results   Component Value Date    TRIG 88 04/26/2019    CHOL 165 04/26/2019    HDL 50.0 04/26/2019     No results found for: \"HGBA1C\"    Assessment/Plan       Discussed patient assistance program with daughter. Patient seems to qualify, family to drop off 1040 tax forms to Dr. Sharif's office tomorrow. Resent Eliquis to [facility] for  PAP eligibility and mail order once approved. Will follow up in a week for approval status.     Patient Assistance Screening (VAF)    Patient verbally reports monthly or yearly income which is less than 400% federal poverty level     Application for program has been submitted for the following medications: Eliquis    Patient has been informed that program team will be reaching out to them to discuss necessary documentation, instructed to answer phone/return voicemail.     Patient aware this process may take up to 6 weeks.     If approved medication must be filled through [facility] pharmacy and may be picked up or mailed to patient.     Plan:  Follow up 1 week for  PAP approval.     Lori Lewis, PharmD     Continue all meds under the continuation of care with the referring provider and clinical " pharmacy team.

## 2023-11-01 ENCOUNTER — SPECIALTY PHARMACY (OUTPATIENT)
Dept: PHARMACY | Facility: CLINIC | Age: 83
End: 2023-11-01

## 2023-11-01 ENCOUNTER — OFFICE VISIT (OUTPATIENT)
Dept: CARDIOLOGY | Facility: CLINIC | Age: 83
End: 2023-11-01
Payer: MEDICARE

## 2023-11-01 VITALS
BODY MASS INDEX: 31.94 KG/M2 | HEIGHT: 56 IN | DIASTOLIC BLOOD PRESSURE: 68 MMHG | SYSTOLIC BLOOD PRESSURE: 104 MMHG | WEIGHT: 142 LBS | HEART RATE: 88 BPM

## 2023-11-01 DIAGNOSIS — E66.9 CLASS 1 OBESITY WITHOUT SERIOUS COMORBIDITY WITH BODY MASS INDEX (BMI) OF 31.0 TO 31.9 IN ADULT, UNSPECIFIED OBESITY TYPE: ICD-10-CM

## 2023-11-01 DIAGNOSIS — I48.0 PAROXYSMAL ATRIAL FIBRILLATION (MULTI): ICD-10-CM

## 2023-11-01 DIAGNOSIS — Z79.01 ANTICOAGULANT LONG-TERM USE: Primary | ICD-10-CM

## 2023-11-01 DIAGNOSIS — I89.0 LYMPHEDEMA OF EXTREMITY: ICD-10-CM

## 2023-11-01 PROCEDURE — 1159F MED LIST DOCD IN RCRD: CPT | Performed by: INTERNAL MEDICINE

## 2023-11-01 PROCEDURE — 1036F TOBACCO NON-USER: CPT | Performed by: INTERNAL MEDICINE

## 2023-11-01 PROCEDURE — 99214 OFFICE O/P EST MOD 30 MIN: CPT | Performed by: INTERNAL MEDICINE

## 2023-11-01 RX ORDER — APIXABAN 2.5 MG/1
2.5 TABLET, FILM COATED ORAL DAILY
Qty: 90 TABLET | Refills: 1 | Status: SHIPPED | OUTPATIENT
Start: 2023-11-01 | End: 2023-11-02 | Stop reason: SDUPTHER

## 2023-11-01 RX ORDER — METOPROLOL SUCCINATE 25 MG/1
12.5 TABLET, EXTENDED RELEASE ORAL 2 TIMES DAILY
Qty: 90 TABLET | Refills: 1 | Status: SHIPPED | OUTPATIENT
Start: 2023-11-01 | End: 2024-05-01 | Stop reason: SDUPTHER

## 2023-11-01 NOTE — PROGRESS NOTES
Patient:  Malathi Reyes  YOB: 1940  MRN: [id number]       HPI:       Malathi Reyes is a 83 y.o. female who returns today for cardiac follow-up.   She underwent initial evaluation in September 2018 for worsening fatigue symptoms and was noted to have underlying atrial fibrillation. A Holter monitor showed atrial fibrillation with an average heart rate of 103 bpm. Minimum heart rate was 67 beats minute with a maximal heart rate at 11 AM of 156 bpm. She was started on Toprol-XL 20 mg daily and Eliquis 5 mg twice daily. A Lexiscan myocardial perfusion study was negative for ischemia or infarction. An echocardiogram done October 2, 2018 showed normal LV systolic function with LV ejection fraction 55%. There was moderate biatrial enlargement. Valvular structure and function were normal. Estimated RVSP was 25 mmHg.      She underwent elective cardioversion on November 8, 2018. She converted to normal sinus rhythm but had recurrence of atrial fibrillation. She was given intravenous amiodarone and maintained normal sinus rhythm after a second shock was administered. She was discharged home on amiodarone 200 mg by mouth daily and Toprol-XL. She subsequently discontinued amiodarone and opted for a rate control strategy.      She continues to do well since her last visit. She is without any specific complaints. She denies any chest pain or shortness breath. She denies any orthopnea, PND, or increasing peripheral edema. She denies any palpitations, lightheadedness, near-syncope, or syncope. She denies any fever, chills, or cough. She has not had any bleeding related difficulties. Her blood pressures are well controlled. Lab studies September 6, 2023 showed a normal CBC and comprehensive metabolic profile.  Cholesterol 165 with LDL 97, HDL 50, and triglycerides 88.  TSH 1.45.  Her heart rate in the office today is 88 bpm.  Other details as noted below.      The above portion of this note was dictated by me using  voice recognition software. I personally performed the services described in the documentation. The scribe entering the documentation below was in my presence. I affirm that the information is both accurate and complete.       Objective:     Vitals:    11/01/23 1415   BP: 104/68   Pulse: 88       Wt Readings from Last 4 Encounters:   10/27/23 64.3 kg (141 lb 11.2 oz)   03/22/23 64 kg (141 lb)   09/28/22 63 kg (139 lb)   07/28/22 62.1 kg (137 lb)       Allergies:     Allergies   Allergen Reactions    Amoxicillin Rash    Codeine Hives and Rash        Medications:     Current Outpatient Medications   Medication Instructions    acetaminophen (Tylenol) 325 mg tablet 3 tablets, oral, Every 8 hours    BEE POLLEN ORAL oral, Daily    Eliquis 2.5 mg, oral, Daily    metoprolol succinate XL (Toprol-XL) 25 mg 24 hr tablet 0.5 tablets, oral, 2 times daily       Physical Examination:   GENERAL:  Well developed, well nourished, in no acute distress.  CHEST:  Symmetric and nontender.  NEURO/PSYCH:  Alert and oriented times three with approppriate behavior and responses.  NECK:  Supple, no JVD, no bruit.  LUNGS:  Clear to auscultation bilaterally, normal respiratory effort.  HEART:  Rate and rhythm irregularly irregular with no evident murmur, no gallop appreciated.        There are no rubs, clicks or heaves.  EXTREMITIES:  Warm with good color, no clubbing or cyanosis.  There is no edema noted.  PERIPHERAL VASCULAR:  Pulses present and equally palpable; 2+ throughout.      Lab:     CBC:   Lab Results   Component Value Date    WBC 7.0 09/06/2023    RBC 4.38 09/06/2023    HGB 14.3 09/06/2023    HCT 43.6 09/06/2023     09/06/2023        CMP:    Lab Results   Component Value Date     09/06/2023    K 3.8 09/06/2023     09/06/2023    CO2 30 09/06/2023    BUN 10 09/06/2023    CREATININE 0.71 09/06/2023    GLUCOSE 92 09/06/2023    CALCIUM 9.4 09/06/2023       Magnesium:    Lab Results   Component Value Date    MG 2.26  09/26/2018       Lipid Profile:    Lab Results   Component Value Date    TRIG 88 04/26/2019    HDL 50.0 04/26/2019       TSH:    Lab Results   Component Value Date    TSH 1.45 09/06/2023       PT/INR:    Lab Results   Component Value Date    PROTIME 12.0 12/06/2021    INR 1.0 12/06/2021       BMP:  Lab Results   Component Value Date     09/06/2023     03/07/2023     12/14/2021    K 3.8 09/06/2023    K 4.1 03/07/2023    K 3.7 12/14/2021     09/06/2023     03/07/2023     12/14/2021    CO2 30 09/06/2023    CO2 30 03/07/2023    CO2 27 12/14/2021    BUN 10 09/06/2023    BUN 9 03/07/2023    BUN 8 12/14/2021    CREATININE 0.71 09/06/2023    CREATININE 0.68 03/07/2023    CREATININE 0.76 12/14/2021       CBC:  Lab Results   Component Value Date    WBC 7.0 09/06/2023    WBC 7.2 03/07/2023    WBC 14.5 (H) 12/14/2021    RBC 4.38 09/06/2023    RBC 4.41 03/07/2023    RBC 3.65 (L) 12/14/2021    HGB 14.3 09/06/2023    HGB 14.2 03/07/2023    HGB 11.7 (L) 12/14/2021    HCT 43.6 09/06/2023    HCT 43.6 03/07/2023    HCT 35.6 (L) 12/14/2021     09/06/2023    MCV 99 03/07/2023    MCV 98 12/14/2021    MCHC 32.8 09/06/2023    MCHC 32.6 03/07/2023    MCHC 32.9 12/14/2021    RDW 13.0 09/06/2023    RDW 12.3 03/07/2023    RDW 12.5 12/14/2021     09/06/2023     03/07/2023     (L) 12/14/2021       Hepatic Function Panel:    Lab Results   Component Value Date    ALKPHOS 64 09/06/2023    ALT 12 09/06/2023    AST 16 09/06/2023    PROT 7.0 09/06/2023    BILITOT 0.8 09/06/2023       Problem List:     Patient Active Problem List   Diagnosis    Acute mid back pain    Fatigue    Inguinal hernia    Irregular heart beat    Kyphoscoliosis    Thoracic scoliosis    Left knee DJD    Lumbar pain    Lymphedema of extremity    Nausea in adult    Headache    Neck pain    Obese    Otitis externa, left    Pain in left knee    Paroxysmal atrial fibrillation (CMS/HCC)    Shortness of breath    Sinusitis     Status post left knee replacement    Tachycardia    Anticoagulant long-term use    Class 1 obesity with body mass index (BMI) of 31.0 to 31.9 in adult       Asessment:     Problem List Items Addressed This Visit             ICD-10-CM    Lymphedema of extremity I89.0    Relevant Orders    Lipid panel    Paroxysmal atrial fibrillation (CMS/HCC) I48.0    Relevant Medications    metoprolol succinate XL (Toprol-XL) 25 mg 24 hr tablet    Other Relevant Orders    Follow Up In Cardiology    CBC    Comprehensive metabolic panel    Lipid panel    Anticoagulant long-term use - Primary Z79.01    Relevant Orders    CBC    Class 1 obesity with body mass index (BMI) of 31.0 to 31.9 in adult E66.9, Z68.31    Relevant Orders    Lipid panel

## 2023-11-02 ENCOUNTER — PHARMACY VISIT (OUTPATIENT)
Dept: PHARMACY | Facility: CLINIC | Age: 83
End: 2023-11-02
Payer: MEDICARE

## 2023-11-02 DIAGNOSIS — I48.0 PAROXYSMAL ATRIAL FIBRILLATION (MULTI): ICD-10-CM

## 2023-11-02 RX ORDER — APIXABAN 2.5 MG/1
2.5 TABLET, FILM COATED ORAL DAILY
Qty: 90 TABLET | Refills: 1 | Status: SHIPPED | OUTPATIENT
Start: 2023-11-02 | End: 2023-11-07 | Stop reason: SDUPTHER

## 2023-11-07 ENCOUNTER — TELEMEDICINE (OUTPATIENT)
Dept: PHARMACY | Facility: HOSPITAL | Age: 83
End: 2023-11-07
Payer: MEDICARE

## 2023-11-07 ENCOUNTER — PHARMACY VISIT (OUTPATIENT)
Dept: PHARMACY | Facility: CLINIC | Age: 83
End: 2023-11-07
Payer: MEDICARE

## 2023-11-07 DIAGNOSIS — I48.0 PAROXYSMAL ATRIAL FIBRILLATION (MULTI): ICD-10-CM

## 2023-11-07 DIAGNOSIS — I48.91 ATRIAL FIBRILLATION, UNSPECIFIED TYPE (MULTI): ICD-10-CM

## 2023-11-07 PROCEDURE — RXMED WILLOW AMBULATORY MEDICATION CHARGE

## 2023-11-07 RX ORDER — APIXABAN 2.5 MG/1
2.5 TABLET, FILM COATED ORAL DAILY
Qty: 90 TABLET | Refills: 3 | Status: SHIPPED | OUTPATIENT
Start: 2023-11-07 | End: 2024-11-01

## 2023-11-07 NOTE — PROGRESS NOTES
"Subjective   Patient ID: Malathi Reyes is a 83 y.o. female who presents for Cost concerns with eliquis.    Referring Provider: DO ABA Carter  Patient and family would like to look into cost assistance for Eliquis. Referred by PCP for evaluation.    Review of Systems    Objective     Labs  Lab Results   Component Value Date    BILITOT 0.8 09/06/2023    CALCIUM 9.4 09/06/2023    CO2 30 09/06/2023     09/06/2023    CREATININE 0.71 09/06/2023    GLUCOSE 92 09/06/2023    ALKPHOS 64 09/06/2023    K 3.8 09/06/2023    PROT 7.0 09/06/2023     09/06/2023    AST 16 09/06/2023    ALT 12 09/06/2023    BUN 10 09/06/2023    ANIONGAP 12 09/06/2023    MG 2.26 09/26/2018    ALBUMIN 4.5 09/06/2023    AMYLASE 45 04/22/2020    LIPASE 20 04/22/2020    GFRF 84 09/06/2023     Lab Results   Component Value Date    TRIG 88 04/26/2019    CHOL 165 04/26/2019    HDL 50.0 04/26/2019     No results found for: \"HGBA1C\"    Assessment/Plan   Patient's  PAP for Eliquis was approved. Copay is $0 until 11/2/2024. Told family to call me back if any issues with shipping from [facility] or if patient gets placed on any other costly medications to review if they qualify for  PAP. Will follow up in 11 months for  PAP renewal.    Plan:  Follow up 11 months for  PAP renewal.     Lori Lewis, PharmD     Continue all meds under the continuation of care with the referring provider and clinical pharmacy team.   "

## 2023-12-11 DIAGNOSIS — Z96.652 S/P TOTAL KNEE ARTHROPLASTY, LEFT: Primary | ICD-10-CM

## 2023-12-12 ENCOUNTER — OFFICE VISIT (OUTPATIENT)
Dept: ORTHOPEDIC SURGERY | Facility: CLINIC | Age: 83
End: 2023-12-12
Payer: MEDICARE

## 2023-12-12 ENCOUNTER — TELEPHONE (OUTPATIENT)
Dept: PRIMARY CARE | Facility: CLINIC | Age: 83
End: 2023-12-12

## 2023-12-12 ENCOUNTER — CLINICAL SUPPORT (OUTPATIENT)
Dept: ORTHOPEDIC SURGERY | Facility: CLINIC | Age: 83
End: 2023-12-12
Payer: MEDICARE

## 2023-12-12 DIAGNOSIS — Z96.652 STATUS POST TOTAL LEFT KNEE REPLACEMENT: ICD-10-CM

## 2023-12-12 PROCEDURE — 99212 OFFICE O/P EST SF 10 MIN: CPT | Performed by: ORTHOPAEDIC SURGERY

## 2023-12-12 PROCEDURE — 1159F MED LIST DOCD IN RCRD: CPT | Performed by: ORTHOPAEDIC SURGERY

## 2023-12-12 PROCEDURE — 73562 X-RAY EXAM OF KNEE 3: CPT | Mod: LEFT SIDE | Performed by: ORTHOPAEDIC SURGERY

## 2023-12-12 PROCEDURE — 1036F TOBACCO NON-USER: CPT | Performed by: ORTHOPAEDIC SURGERY

## 2023-12-12 NOTE — PROGRESS NOTES
Chief Complaint   Patient presents with    Left Knee - Pain     Yearly follow up 12-13-21  Xrays today        The patient is here for follow-up of their side: left knee arthroplasty.  The patient has no knee pain.  The patient has no mechanical symptoms.  The patient has no swelling.  The patient is approximately 2 year(s) postop    Physical examination:    Examination of the side: left knee  The incision is healing well  No erythema or warmth.  No instability varus or valgus stressing the knee at 0, 30 or 60 degrees.  No instability in the AP plane at 90 degrees.  Range of motion: 0 degrees extension, 120 degrees flexion  There is no tenderness  Calf is soft, Homans negative  The patient has intact ankle dorsiflexion and plantarflexion.    Radiographs:   XR knee left 3 views  Interpreted By:  Jani Birch,   STUDY:  XR KNEE LEFT 3 VIEWS;  ;  12/12/2023 10:48 am      INDICATION:  Signs/Symptoms:pain.      ACCESSION NUMBER(S):  [id number]      ORDERING CLINICIAN:  JANI BIRCH      FINDINGS:  Left knee films show a total knee arthroplasty in satisfactory  position. The patella is well positioned. No fracture is identified.  No obvious loosening or wear is appreciated.          Signed by: Jani Birch 12/12/2023 10:50 AM  Dictation workstation:   [id number]        Impression:  Status post side: left total knee arthroplasty    Plan:  Discussed the importance of prophylactic dental antibiotics  Follow up as needed  All questions answered

## 2023-12-12 NOTE — TELEPHONE ENCOUNTER
Good Morning,    Can you please call Pallavi Bruce.( [phone])  She is the daughter of Malathi Reyes and she has questions and concerns about her medications and a letter she received from you.     Thank you.    Suellen

## 2024-02-01 ENCOUNTER — PHARMACY VISIT (OUTPATIENT)
Dept: PHARMACY | Facility: CLINIC | Age: 84
End: 2024-02-01
Payer: MEDICARE

## 2024-02-01 PROCEDURE — RXMED WILLOW AMBULATORY MEDICATION CHARGE

## 2024-03-13 ENCOUNTER — OFFICE VISIT (OUTPATIENT)
Dept: PRIMARY CARE | Facility: CLINIC | Age: 84
End: 2024-03-13
Payer: MEDICARE

## 2024-03-13 VITALS
RESPIRATION RATE: 17 BRPM | SYSTOLIC BLOOD PRESSURE: 100 MMHG | WEIGHT: 138 LBS | BODY MASS INDEX: 30.94 KG/M2 | HEART RATE: 70 BPM | DIASTOLIC BLOOD PRESSURE: 68 MMHG | TEMPERATURE: 97.5 F

## 2024-03-13 DIAGNOSIS — H92.01 RIGHT EAR PAIN: Primary | ICD-10-CM

## 2024-03-13 PROCEDURE — 99212 OFFICE O/P EST SF 10 MIN: CPT | Performed by: FAMILY MEDICINE

## 2024-03-13 ASSESSMENT — ENCOUNTER SYMPTOMS
HEADACHES: 0
COUGH: 0
DIZZINESS: 0
SORE THROAT: 0

## 2024-03-13 NOTE — PROGRESS NOTES
Subjective   Patient ID: Malathi Reyes is a 83 y.o. female who presents for Earache.    Earache   There is pain in the right ear. This is a new problem. The current episode started in the past 7 days. The problem has been gradually improving. There has been no fever. The pain is at a severity of 8/10. Pertinent negatives include no coughing, ear discharge, headaches, hearing loss, rash or sore throat. She has tried nothing for the symptoms.        Review of Systems   HENT:  Positive for ear pain. Negative for ear discharge, hearing loss and sore throat.    Respiratory:  Negative for cough.    Skin:  Negative for rash.   Neurological:  Negative for dizziness and headaches.       Objective   /68   Pulse 70   Temp 36.4 °C (97.5 °F)   Resp 17   Wt 62.6 kg (138 lb)   BMI 30.94 kg/m²     Physical Exam  Constitutional:       Appearance: Normal appearance.   HENT:      Head: Normocephalic.      Right Ear: Tympanic membrane, ear canal and external ear normal. There is no impacted cerumen.      Left Ear: Tympanic membrane, ear canal and external ear normal. There is no impacted cerumen.      Nose: No congestion.      Mouth/Throat:      Pharynx: No posterior oropharyngeal erythema.   Pulmonary:      Effort: Pulmonary effort is normal.   Musculoskeletal:      Cervical back: Neck supple.   Skin:     General: Skin is warm and dry.   Psychiatric:         Mood and Affect: Mood normal.         Assessment/Plan   Problem List Items Addressed This Visit    None  Visit Diagnoses         Codes    Right ear pain    -  Primary H92.01        reassure

## 2024-03-19 ENCOUNTER — TELEPHONE (OUTPATIENT)
Dept: ORTHOPEDIC SURGERY | Facility: CLINIC | Age: 84
End: 2024-03-19
Payer: MEDICARE

## 2024-03-19 DIAGNOSIS — Z79.2 PROPHYLACTIC ANTIBIOTIC: ICD-10-CM

## 2024-03-19 NOTE — TELEPHONE ENCOUNTER
Patient is having dental work and [facility] [place], procedure is on 03/21/2024

## 2024-03-20 RX ORDER — CLINDAMYCIN HYDROCHLORIDE 300 MG/1
CAPSULE ORAL
Qty: 2 CAPSULE | Refills: 2 | Status: SHIPPED | OUTPATIENT
Start: 2024-03-20

## 2024-04-17 DIAGNOSIS — Z96.652 STATUS POST LEFT KNEE REPLACEMENT: Primary | ICD-10-CM

## 2024-04-17 RX ORDER — CLINDAMYCIN HYDROCHLORIDE 300 MG/1
CAPSULE ORAL
Qty: 2 CAPSULE | Refills: 5 | Status: SHIPPED | OUTPATIENT
Start: 2024-04-17 | End: 2024-05-01 | Stop reason: WASHOUT

## 2024-04-30 ENCOUNTER — APPOINTMENT (OUTPATIENT)
Dept: PRIMARY CARE | Facility: CLINIC | Age: 84
End: 2024-04-30
Payer: MEDICARE

## 2024-05-01 ENCOUNTER — OFFICE VISIT (OUTPATIENT)
Dept: CARDIOLOGY | Facility: CLINIC | Age: 84
End: 2024-05-01
Payer: MEDICARE

## 2024-05-01 ENCOUNTER — LAB (OUTPATIENT)
Dept: LAB | Facility: LAB | Age: 84
End: 2024-05-01
Payer: MEDICARE

## 2024-05-01 VITALS
HEIGHT: 56 IN | SYSTOLIC BLOOD PRESSURE: 105 MMHG | WEIGHT: 137.6 LBS | DIASTOLIC BLOOD PRESSURE: 67 MMHG | HEART RATE: 91 BPM | BODY MASS INDEX: 30.95 KG/M2

## 2024-05-01 DIAGNOSIS — I89.0 LYMPHEDEMA OF EXTREMITY: ICD-10-CM

## 2024-05-01 DIAGNOSIS — I48.21 PERMANENT ATRIAL FIBRILLATION (MULTI): Primary | ICD-10-CM

## 2024-05-01 DIAGNOSIS — R04.0 EPISTAXIS: ICD-10-CM

## 2024-05-01 DIAGNOSIS — Z79.01 ANTICOAGULANT LONG-TERM USE: ICD-10-CM

## 2024-05-01 DIAGNOSIS — E66.9 CLASS 1 OBESITY WITHOUT SERIOUS COMORBIDITY WITH BODY MASS INDEX (BMI) OF 31.0 TO 31.9 IN ADULT, UNSPECIFIED OBESITY TYPE: ICD-10-CM

## 2024-05-01 DIAGNOSIS — I48.0 PAROXYSMAL ATRIAL FIBRILLATION (MULTI): ICD-10-CM

## 2024-05-01 LAB
ALBUMIN SERPL BCP-MCNC: 4.4 G/DL (ref 3.4–5)
ALP SERPL-CCNC: 60 U/L (ref 33–136)
ALT SERPL W P-5'-P-CCNC: 11 U/L (ref 7–45)
ANION GAP SERPL CALC-SCNC: 12 MMOL/L (ref 10–20)
AST SERPL W P-5'-P-CCNC: 14 U/L (ref 9–39)
BILIRUB SERPL-MCNC: 0.9 MG/DL (ref 0–1.2)
BUN SERPL-MCNC: 11 MG/DL (ref 6–23)
CALCIUM SERPL-MCNC: 9.3 MG/DL (ref 8.6–10.3)
CHLORIDE SERPL-SCNC: 105 MMOL/L (ref 98–107)
CHOLEST SERPL-MCNC: 194 MG/DL (ref 0–199)
CHOLESTEROL/HDL RATIO: 3.6
CO2 SERPL-SCNC: 30 MMOL/L (ref 21–32)
CREAT SERPL-MCNC: 0.71 MG/DL (ref 0.5–1.05)
EGFRCR SERPLBLD CKD-EPI 2021: 84 ML/MIN/1.73M*2
ERYTHROCYTE [DISTWIDTH] IN BLOOD BY AUTOMATED COUNT: 12.6 % (ref 11.5–14.5)
GLUCOSE SERPL-MCNC: 109 MG/DL (ref 74–99)
HCT VFR BLD AUTO: 42.3 % (ref 36–46)
HDLC SERPL-MCNC: 54.3 MG/DL
HGB BLD-MCNC: 14.4 G/DL (ref 12–16)
LDLC SERPL CALC-MCNC: 111 MG/DL
MCH RBC QN AUTO: 33 PG (ref 26–34)
MCHC RBC AUTO-ENTMCNC: 34 G/DL (ref 32–36)
MCV RBC AUTO: 97 FL (ref 80–100)
NON HDL CHOLESTEROL: 140 MG/DL (ref 0–149)
NRBC BLD-RTO: 0 /100 WBCS (ref 0–0)
PLATELET # BLD AUTO: 163 X10*3/UL (ref 150–450)
POTASSIUM SERPL-SCNC: 4 MMOL/L (ref 3.5–5.3)
PROT SERPL-MCNC: 6.7 G/DL (ref 6.4–8.2)
RBC # BLD AUTO: 4.36 X10*6/UL (ref 4–5.2)
SODIUM SERPL-SCNC: 143 MMOL/L (ref 136–145)
TRIGL SERPL-MCNC: 144 MG/DL (ref 0–149)
VLDL: 29 MG/DL (ref 0–40)
WBC # BLD AUTO: 6.3 X10*3/UL (ref 4.4–11.3)

## 2024-05-01 PROCEDURE — 85027 COMPLETE CBC AUTOMATED: CPT

## 2024-05-01 PROCEDURE — 36415 COLL VENOUS BLD VENIPUNCTURE: CPT

## 2024-05-01 PROCEDURE — 80061 LIPID PANEL: CPT

## 2024-05-01 PROCEDURE — 1036F TOBACCO NON-USER: CPT | Performed by: INTERNAL MEDICINE

## 2024-05-01 PROCEDURE — 99214 OFFICE O/P EST MOD 30 MIN: CPT | Performed by: INTERNAL MEDICINE

## 2024-05-01 PROCEDURE — 1159F MED LIST DOCD IN RCRD: CPT | Performed by: INTERNAL MEDICINE

## 2024-05-01 PROCEDURE — 80053 COMPREHEN METABOLIC PANEL: CPT

## 2024-05-01 RX ORDER — METOPROLOL SUCCINATE 25 MG/1
12.5 TABLET, EXTENDED RELEASE ORAL 2 TIMES DAILY
Qty: 90 TABLET | Refills: 1 | Status: SHIPPED | OUTPATIENT
Start: 2024-05-01

## 2024-05-01 NOTE — PROGRESS NOTES
Patient:  Malathi Reyes  YOB: 1940  MRN: [id number]       HPI:       Malathi Reyes is a 83 y.o. female who returns today for cardiac follow-up.   She underwent initial evaluation in September 2018 for worsening fatigue and was noted to have underlying atrial fibrillation. A Holter monitor showed atrial fibrillation with an average heart rate of 103 bpm. Minimum heart rate was 67 beats minute with a maximal heart rate at 11 AM of 156 bpm. She was started on Toprol-XL 20 mg daily and Eliquis 5 mg twice daily. A Lexiscan myocardial perfusion study was negative for ischemia or infarction. An echocardiogram done October 2, 2018 showed normal LV systolic function with LV ejection fraction 55%. There was moderate biatrial enlargement. Valvular structure and function were normal. Estimated RVSP was 25 mmHg.      She underwent elective cardioversion on November 8, 2018. She converted to normal sinus rhythm but had recurrence of atrial fibrillation. She was given intravenous amiodarone and maintained normal sinus rhythm after a second shock was administered. She was discharged home on amiodarone 200 mg by mouth daily and Toprol-XL. She subsequently discontinued amiodarone and opted for a rate control strategy.      She has been doing since her last visit.  She does note some intermittent epistaxis from the right nostril.  This has been going on for several weeks to months.  She decreased her dose of Eliquis to 2.5 mg daily.  She sometimes skips a day completely.  I advised her to take the medication as prescribed if possible as she is otherwise at increased risk for thromboembolic stroke.  She will be referred to ENT.  She denies any chest pain or shortness breath. She denies any orthopnea, PND, or increasing peripheral edema. She denies any palpitations, lightheadedness, near-syncope, or syncope. She denies any fever, chills, or cough. She has not had any bleeding related difficulties. Her blood pressures are  well controlled. Lab studies September 6, 2023 showed a normal CBC and comprehensive metabolic profile.  Cholesterol 165 with LDL 97, HDL 50, and triglycerides 88.  TSH 1.45.  Her heart rate in the office today is 88 bpm.  Other details as noted below.      The above portion of this note was dictated by me using voice recognition software. I personally performed the services described in the documentation. The scribe entering the documentation below was in my presence. I affirm that the information is both accurate and complete.      Objective:     Vitals:    05/01/24 1320   BP: 105/67   Pulse: 91       Wt Readings from Last 4 Encounters:   05/01/24 62.4 kg (137 lb 9.6 oz)   03/13/24 62.6 kg (138 lb)   11/01/23 64.4 kg (142 lb)   10/27/23 64.3 kg (141 lb 11.2 oz)       Allergies:     Allergies   Allergen Reactions    Amoxicillin Rash    Codeine Hives and Rash        Medications:     Current Outpatient Medications   Medication Instructions    acetaminophen (Tylenol) 325 mg tablet 3 tablets, oral, Every 8 hours    BEE POLLEN ORAL oral, Daily    clindamycin (Cleocin HCL) 300 mg capsule Take 2 capsules 1 hour prior to dental appointment    clindamycin (Cleocin HCL) 300 mg capsule Take 2 capsules 1 hour prior to dental appointment    Eliquis 2.5 mg, oral, Daily    metoprolol succinate XL (TOPROL-XL) 12.5 mg, oral, 2 times daily       Physical Examination:   GENERAL:  Well developed, well nourished, in no acute distress.  CHEST:  Symmetric and nontender.  NEURO/PSYCH:  Alert and oriented times three with approppriate behavior and responses.  NECK:  Supple, no JVD, no bruit.  LUNGS:  Clear to auscultation bilaterally, normal respiratory effort.  HEART:  Rate and rhythm irregularly irregular with no evident murmur, no gallop appreciated.        There are no rubs, clicks or heaves.  EXTREMITIES:  Warm with good color, no clubbing or cyanosis.  There is 1+ bilateral edema noted.  PERIPHERAL VASCULAR:  Pulses present and  equally palpable; 2+ throughout.      Lab:     CBC:   Lab Results   Component Value Date    WBC 7.0 09/06/2023    RBC 4.38 09/06/2023    HGB 14.3 09/06/2023    HCT 43.6 09/06/2023     09/06/2023        CMP:    Lab Results   Component Value Date     09/06/2023    K 3.8 09/06/2023     09/06/2023    CO2 30 09/06/2023    BUN 10 09/06/2023    CREATININE 0.71 09/06/2023    GLUCOSE 92 09/06/2023    CALCIUM 9.4 09/06/2023       Magnesium:    Lab Results   Component Value Date    MG 2.26 09/26/2018       Lipid Profile:    Lab Results   Component Value Date    TRIG 88 04/26/2019    HDL 50.0 04/26/2019       TSH:    Lab Results   Component Value Date    TSH 1.45 09/06/2023       PT/INR:    Lab Results   Component Value Date    PROTIME 12.0 12/06/2021    INR 1.0 12/06/2021       BMP:  Lab Results   Component Value Date     09/06/2023     03/07/2023     12/14/2021    K 3.8 09/06/2023    K 4.1 03/07/2023    K 3.7 12/14/2021     09/06/2023     03/07/2023     12/14/2021    CO2 30 09/06/2023    CO2 30 03/07/2023    CO2 27 12/14/2021    BUN 10 09/06/2023    BUN 9 03/07/2023    BUN 8 12/14/2021    CREATININE 0.71 09/06/2023    CREATININE 0.68 03/07/2023    CREATININE 0.76 12/14/2021       CBC:  Lab Results   Component Value Date    WBC 7.0 09/06/2023    WBC 7.2 03/07/2023    WBC 14.5 (H) 12/14/2021    RBC 4.38 09/06/2023    RBC 4.41 03/07/2023    RBC 3.65 (L) 12/14/2021    HGB 14.3 09/06/2023    HGB 14.2 03/07/2023    HGB 11.7 (L) 12/14/2021    HCT 43.6 09/06/2023    HCT 43.6 03/07/2023    HCT 35.6 (L) 12/14/2021     09/06/2023    MCV 99 03/07/2023    MCV 98 12/14/2021    MCHC 32.8 09/06/2023    MCHC 32.6 03/07/2023    MCHC 32.9 12/14/2021    RDW 13.0 09/06/2023    RDW 12.3 03/07/2023    RDW 12.5 12/14/2021     09/06/2023     03/07/2023     (L) 12/14/2021       Hepatic Function Panel:    Lab Results   Component Value Date    ALKPHOS 64 09/06/2023    ALT  12 09/06/2023    AST 16 09/06/2023    PROT 7.0 09/06/2023    BILITOT 0.8 09/06/2023       Problem List:     Patient Active Problem List   Diagnosis    Acute mid back pain    Fatigue    Inguinal hernia    Irregular heart beat    Kyphoscoliosis    Thoracic scoliosis    Left knee DJD    Lumbar pain    Lymphedema of extremity    Nausea in adult    Headache    Neck pain    Obese    Otitis externa, left    Pain in left knee    Paroxysmal atrial fibrillation (Multi)    Permanent atrial fibrillation (Multi)    Shortness of breath    Sinusitis    Status post left knee replacement    Tachycardia    Anticoagulant long-term use    Class 1 obesity with body mass index (BMI) of 31.0 to 31.9 in adult       Asessment:     Problem List Items Addressed This Visit             ICD-10-CM    Lymphedema of extremity I89.0    Paroxysmal atrial fibrillation (Multi) I48.0    Relevant Medications    metoprolol succinate XL (Toprol-XL) 25 mg 24 hr tablet    Other Relevant Orders    CBC    Comprehensive metabolic panel    Follow Up In Cardiology    Permanent atrial fibrillation (Multi) - Primary I48.21    Relevant Medications    metoprolol succinate XL (Toprol-XL) 25 mg 24 hr tablet    Anticoagulant long-term use Z79.01    Relevant Orders    CBC    Comprehensive metabolic panel    Follow Up In Cardiology    Epistaxis R04.0    Relevant Orders    Referral to ENT    Follow Up In Cardiology

## 2024-05-30 ENCOUNTER — PATIENT MESSAGE (OUTPATIENT)
Dept: PRIMARY CARE | Facility: CLINIC | Age: 84
End: 2024-05-30
Payer: MEDICARE

## 2024-05-30 DIAGNOSIS — R39.9 UTI SYMPTOMS: Primary | ICD-10-CM

## 2024-05-30 RX ORDER — NITROFURANTOIN 25; 75 MG/1; MG/1
100 CAPSULE ORAL 2 TIMES DAILY
Qty: 14 CAPSULE | Refills: 0 | Status: SHIPPED | OUTPATIENT
Start: 2024-05-30 | End: 2024-06-06

## 2024-07-29 ENCOUNTER — OFFICE VISIT (OUTPATIENT)
Dept: PRIMARY CARE | Facility: CLINIC | Age: 84
End: 2024-07-29
Payer: MEDICARE

## 2024-07-29 VITALS
RESPIRATION RATE: 18 BRPM | WEIGHT: 137 LBS | BODY MASS INDEX: 30.82 KG/M2 | HEART RATE: 98 BPM | TEMPERATURE: 97 F | DIASTOLIC BLOOD PRESSURE: 76 MMHG | SYSTOLIC BLOOD PRESSURE: 110 MMHG | HEIGHT: 56 IN

## 2024-07-29 DIAGNOSIS — L23.7 POISON IVY: Primary | ICD-10-CM

## 2024-07-29 PROCEDURE — 99212 OFFICE O/P EST SF 10 MIN: CPT | Performed by: FAMILY MEDICINE

## 2024-07-29 RX ORDER — METHYLPREDNISOLONE 4 MG/1
TABLET ORAL
Qty: 21 TABLET | Refills: 0 | Status: SHIPPED | OUTPATIENT
Start: 2024-07-29 | End: 2024-08-05

## 2024-07-29 RX ORDER — TRIAMCINOLONE ACETONIDE 1 MG/G
OINTMENT TOPICAL 2 TIMES DAILY PRN
Qty: 60 G | Refills: 0 | Status: SHIPPED | OUTPATIENT
Start: 2024-07-29 | End: 2024-11-26

## 2024-07-29 NOTE — PROGRESS NOTES
"Subjective   Patient ID: Malathi Reyes is a 83 y.o. female who presents for Rash (Pt states she may have poison ivy. ).    Rash  This is a new problem. The current episode started in the past 7 days. The problem has been gradually worsening since onset. The affected locations include the left arm, right arm, right upper leg and left upper leg. The rash is characterized by itchiness, redness and swelling. Associated with: poison ivy from the cat. Past treatments include antihistamine. The treatment provided mild relief.        Review of Systems   Skin:  Positive for rash.       Objective   /76   Pulse 98   Temp 36.1 °C (97 °F)   Resp 18   Ht 1.422 m (4' 8\")   Wt 62.1 kg (137 lb)   BMI 30.71 kg/m²     Physical Exam  Skin:     Findings: Rash present.      Comments: Contact dermatitis on arms         Assessment/Plan   Problem List Items Addressed This Visit    None  Visit Diagnoses         Codes    Poison ivy    -  Primary L23.7    Relevant Medications    methylPREDNISolone (Medrol Dospak) 4 mg tablets    triamcinolone (Kenalog) 0.1 % ointment               "

## 2024-08-01 ENCOUNTER — TELEPHONE (OUTPATIENT)
Dept: PRIMARY CARE | Facility: CLINIC | Age: 84
End: 2024-08-01
Payer: MEDICARE

## 2024-08-01 DIAGNOSIS — R39.9 UTI SYMPTOMS: Primary | ICD-10-CM

## 2024-08-01 NOTE — TELEPHONE ENCOUNTER
Pt called and was given prednisone for poison ivy but know has symptoms of a UTI (pain with urination). Can this be from the medication? She would like to know if a UA can be ordered to she if she has a UTI?

## 2024-08-03 ENCOUNTER — OFFICE VISIT (OUTPATIENT)
Dept: FAMILY MEDICINE CLINIC | Age: 84
End: 2024-08-03
Payer: MEDICARE

## 2024-08-03 VITALS
TEMPERATURE: 97.7 F | HEIGHT: 56 IN | HEART RATE: 89 BPM | SYSTOLIC BLOOD PRESSURE: 132 MMHG | OXYGEN SATURATION: 98 % | DIASTOLIC BLOOD PRESSURE: 88 MMHG | BODY MASS INDEX: 30.82 KG/M2 | WEIGHT: 137 LBS

## 2024-08-03 DIAGNOSIS — R39.9 LOWER URINARY TRACT SYMPTOMS (LUTS): ICD-10-CM

## 2024-08-03 DIAGNOSIS — N30.01 ACUTE CYSTITIS WITH HEMATURIA: Primary | ICD-10-CM

## 2024-08-03 LAB
BILIRUBIN, POC: ABNORMAL
BLOOD URINE, POC: ABNORMAL
CLARITY, POC: ABNORMAL
COLOR, POC: ABNORMAL
GLUCOSE URINE, POC: ABNORMAL
KETONES, POC: ABNORMAL
LEUKOCYTE EST, POC: ABNORMAL
NITRITE, POC: ABNORMAL
PH, POC: 7
PROTEIN, POC: ABNORMAL
SPECIFIC GRAVITY, POC: 1
UROBILINOGEN, POC: ABNORMAL

## 2024-08-03 PROCEDURE — G8400 PT W/DXA NO RESULTS DOC: HCPCS | Performed by: NURSE PRACTITIONER

## 2024-08-03 PROCEDURE — G8417 CALC BMI ABV UP PARAM F/U: HCPCS | Performed by: NURSE PRACTITIONER

## 2024-08-03 PROCEDURE — 1036F TOBACCO NON-USER: CPT | Performed by: NURSE PRACTITIONER

## 2024-08-03 PROCEDURE — 1123F ACP DISCUSS/DSCN MKR DOCD: CPT | Performed by: NURSE PRACTITIONER

## 2024-08-03 PROCEDURE — 1090F PRES/ABSN URINE INCON ASSESS: CPT | Performed by: NURSE PRACTITIONER

## 2024-08-03 PROCEDURE — 99203 OFFICE O/P NEW LOW 30 MIN: CPT | Performed by: NURSE PRACTITIONER

## 2024-08-03 PROCEDURE — 81003 URINALYSIS AUTO W/O SCOPE: CPT | Performed by: NURSE PRACTITIONER

## 2024-08-03 PROCEDURE — G8427 DOCREV CUR MEDS BY ELIG CLIN: HCPCS | Performed by: NURSE PRACTITIONER

## 2024-08-03 RX ORDER — METHYLPREDNISOLONE 4 MG/1
TABLET ORAL
COMMUNITY
Start: 2024-07-29

## 2024-08-03 RX ORDER — NITROFURANTOIN 25; 75 MG/1; MG/1
100 CAPSULE ORAL 2 TIMES DAILY
Qty: 14 CAPSULE | Refills: 0 | Status: SHIPPED | OUTPATIENT
Start: 2024-08-03 | End: 2024-08-10

## 2024-08-03 RX ORDER — NITROFURANTOIN 25; 75 MG/1; MG/1
CAPSULE ORAL
COMMUNITY
Start: 2024-05-30 | End: 2024-08-03 | Stop reason: ALTCHOICE

## 2024-08-03 ASSESSMENT — ENCOUNTER SYMPTOMS
NAUSEA: 0
SHORTNESS OF BREATH: 0
WHEEZING: 0
COUGH: 0
SORE THROAT: 0
RHINORRHEA: 0
VOMITING: 0
DIARRHEA: 0
EYE REDNESS: 0
TROUBLE SWALLOWING: 0

## 2024-08-03 NOTE — PROGRESS NOTES
Sig: Take 1 capsule by mouth 2 times daily for 7 days     Dispense:  14 capsule     Refill:  0     Medications Discontinued During This Encounter   Medication Reason    nitrofurantoin, macrocrystal-monohydrate, (MACROBID) 100 MG capsule Therapy completed     Return if symptoms worsen or fail to improve.        Reviewed with the patient/family: current clinical status & medications.  Side effects of the medication prescribed today, as well as treatment plan/rationale and result expectations have been discussed with the patient/family who expresses understanding. Patient will be discharged home in stable condition.    Follow up with PCP to evaluate treatment results or return if symptoms worsen or fail to improve. Discussed signs and symptoms which require immediate follow-up in ED/call to 911.  Understanding verbalized.     I have reviewed the patient's medical history in detail and updated the computerized patient record.    Becky Hawley, STACY - CNP

## 2024-08-04 DIAGNOSIS — R39.9 LOWER URINARY TRACT SYMPTOMS (LUTS): ICD-10-CM

## 2024-08-05 LAB — BACTERIA UR CULT: NORMAL

## 2024-10-01 ENCOUNTER — APPOINTMENT (OUTPATIENT)
Dept: PHARMACY | Facility: HOSPITAL | Age: 84
End: 2024-10-01
Payer: MEDICARE

## 2024-10-01 DIAGNOSIS — I48.91 ATRIAL FIBRILLATION, UNSPECIFIED TYPE (MULTI): ICD-10-CM

## 2024-10-04 NOTE — PROGRESS NOTES
"Subjective   Patient ID: Malathi Reyes is a 83 y.o. female who presents for Cost concerns with eliquis.    Referring Provider: DO ABA Carter  Patient and family would like to look into cost assistance for Eliquis. Referred by PCP for evaluation.    Review of Systems    Objective     Labs  Lab Results   Component Value Date    BILITOT 0.9 05/01/2024    CALCIUM 9.3 05/01/2024    CO2 30 05/01/2024     05/01/2024    CREATININE 0.71 05/01/2024    GLUCOSE 109 (H) 05/01/2024    ALKPHOS 60 05/01/2024    K 4.0 05/01/2024    PROT 6.7 05/01/2024     05/01/2024    AST 14 05/01/2024    ALT 11 05/01/2024    BUN 11 05/01/2024    ANIONGAP 12 05/01/2024    MG 2.26 09/26/2018    ALBUMIN 4.4 05/01/2024    AMYLASE 45 04/22/2020    LIPASE 20 04/22/2020    GFRF 84 09/06/2023     Lab Results   Component Value Date    TRIG 144 05/01/2024    CHOL 194 05/01/2024    LDLCALC 111 (H) 05/01/2024    HDL 54.3 05/01/2024     No results found for: \"HGBA1C\"    Assessment/Plan   Patient's was contacted for yearly  PAP renewal for Eliquis. Patient decided not to renew this year. Told her to call back if she changes her mind. Will follow up as needed.    Plan:  Follow up as needed     Lori Lewis, PharmD     Continue all meds under the continuation of care with the referring provider and clinical pharmacy team.   "

## 2024-10-28 ENCOUNTER — LAB (OUTPATIENT)
Dept: LAB | Facility: LAB | Age: 84
End: 2024-10-28
Payer: MEDICARE

## 2024-10-28 ENCOUNTER — APPOINTMENT (OUTPATIENT)
Dept: PRIMARY CARE | Facility: CLINIC | Age: 84
End: 2024-10-28
Payer: MEDICARE

## 2024-10-28 VITALS
DIASTOLIC BLOOD PRESSURE: 78 MMHG | SYSTOLIC BLOOD PRESSURE: 116 MMHG | RESPIRATION RATE: 18 BRPM | OXYGEN SATURATION: 98 % | BODY MASS INDEX: 30.82 KG/M2 | TEMPERATURE: 96.9 F | HEIGHT: 56 IN | HEART RATE: 90 BPM | WEIGHT: 137 LBS

## 2024-10-28 DIAGNOSIS — M41.9 KYPHOSCOLIOSIS: ICD-10-CM

## 2024-10-28 DIAGNOSIS — R53.83 FATIGUE, UNSPECIFIED TYPE: ICD-10-CM

## 2024-10-28 DIAGNOSIS — I48.0 PAROXYSMAL ATRIAL FIBRILLATION (MULTI): ICD-10-CM

## 2024-10-28 DIAGNOSIS — R73.9 HYPERGLYCEMIA: ICD-10-CM

## 2024-10-28 DIAGNOSIS — Z00.00 MEDICARE ANNUAL WELLNESS VISIT, SUBSEQUENT: Primary | ICD-10-CM

## 2024-10-28 DIAGNOSIS — Z00.00 MEDICARE ANNUAL WELLNESS VISIT, SUBSEQUENT: ICD-10-CM

## 2024-10-28 LAB
ALBUMIN SERPL BCP-MCNC: 4.5 G/DL (ref 3.4–5)
ALP SERPL-CCNC: 61 U/L (ref 33–136)
ALT SERPL W P-5'-P-CCNC: 14 U/L (ref 7–45)
ANION GAP SERPL CALC-SCNC: 11 MMOL/L (ref 10–20)
AST SERPL W P-5'-P-CCNC: 17 U/L (ref 9–39)
BASOPHILS # BLD AUTO: 0.03 X10*3/UL (ref 0–0.1)
BASOPHILS NFR BLD AUTO: 0.4 %
BILIRUB SERPL-MCNC: 0.9 MG/DL (ref 0–1.2)
BUN SERPL-MCNC: 9 MG/DL (ref 6–23)
CALCIUM SERPL-MCNC: 9.7 MG/DL (ref 8.6–10.6)
CHLORIDE SERPL-SCNC: 105 MMOL/L (ref 98–107)
CO2 SERPL-SCNC: 29 MMOL/L (ref 21–32)
CREAT SERPL-MCNC: 0.69 MG/DL (ref 0.5–1.05)
EGFRCR SERPLBLD CKD-EPI 2021: 86 ML/MIN/1.73M*2
EOSINOPHIL # BLD AUTO: 0.17 X10*3/UL (ref 0–0.4)
EOSINOPHIL NFR BLD AUTO: 2.4 %
ERYTHROCYTE [DISTWIDTH] IN BLOOD BY AUTOMATED COUNT: 12.6 % (ref 11.5–14.5)
EST. AVERAGE GLUCOSE BLD GHB EST-MCNC: 91 MG/DL
GLUCOSE SERPL-MCNC: 96 MG/DL (ref 74–99)
HBA1C MFR BLD: 4.8 %
HCT VFR BLD AUTO: 44.7 % (ref 36–46)
HGB BLD-MCNC: 14.8 G/DL (ref 12–16)
IMM GRANULOCYTES # BLD AUTO: 0.02 X10*3/UL (ref 0–0.5)
IMM GRANULOCYTES NFR BLD AUTO: 0.3 % (ref 0–0.9)
LYMPHOCYTES # BLD AUTO: 1.47 X10*3/UL (ref 0.8–3)
LYMPHOCYTES NFR BLD AUTO: 20.8 %
MCH RBC QN AUTO: 32.4 PG (ref 26–34)
MCHC RBC AUTO-ENTMCNC: 33.1 G/DL (ref 32–36)
MCV RBC AUTO: 98 FL (ref 80–100)
MONOCYTES # BLD AUTO: 0.59 X10*3/UL (ref 0.05–0.8)
MONOCYTES NFR BLD AUTO: 8.4 %
NEUTROPHILS # BLD AUTO: 4.78 X10*3/UL (ref 1.6–5.5)
NEUTROPHILS NFR BLD AUTO: 67.7 %
NRBC BLD-RTO: 0 /100 WBCS (ref 0–0)
PLATELET # BLD AUTO: 209 X10*3/UL (ref 150–450)
POTASSIUM SERPL-SCNC: 4.4 MMOL/L (ref 3.5–5.3)
PROT SERPL-MCNC: 6.9 G/DL (ref 6.4–8.2)
RBC # BLD AUTO: 4.57 X10*6/UL (ref 4–5.2)
SODIUM SERPL-SCNC: 141 MMOL/L (ref 136–145)
TSH SERPL-ACNC: 0.81 MIU/L (ref 0.44–3.98)
WBC # BLD AUTO: 7.1 X10*3/UL (ref 4.4–11.3)

## 2024-10-28 PROCEDURE — 99213 OFFICE O/P EST LOW 20 MIN: CPT | Performed by: FAMILY MEDICINE

## 2024-10-28 PROCEDURE — 80053 COMPREHEN METABOLIC PANEL: CPT

## 2024-10-28 PROCEDURE — 84443 ASSAY THYROID STIM HORMONE: CPT

## 2024-10-28 PROCEDURE — 36415 COLL VENOUS BLD VENIPUNCTURE: CPT

## 2024-10-28 PROCEDURE — G0439 PPPS, SUBSEQ VISIT: HCPCS | Performed by: FAMILY MEDICINE

## 2024-10-28 PROCEDURE — 85025 COMPLETE CBC W/AUTO DIFF WBC: CPT

## 2024-10-28 PROCEDURE — 83036 HEMOGLOBIN GLYCOSYLATED A1C: CPT

## 2024-10-28 RX ORDER — APIXABAN 2.5 MG/1
2.5 TABLET, FILM COATED ORAL DAILY
Qty: 90 TABLET | Refills: 3 | Status: SHIPPED | OUTPATIENT
Start: 2024-10-28 | End: 2024-10-28 | Stop reason: WASHOUT

## 2024-10-28 ASSESSMENT — PATIENT HEALTH QUESTIONNAIRE - PHQ9
2. FEELING DOWN, DEPRESSED OR HOPELESS: NOT AT ALL
SUM OF ALL RESPONSES TO PHQ9 QUESTIONS 1 AND 2: 0
1. LITTLE INTEREST OR PLEASURE IN DOING THINGS: NOT AT ALL

## 2024-10-28 ASSESSMENT — ENCOUNTER SYMPTOMS
CHEST TIGHTNESS: 0
BACK PAIN: 1
HEADACHES: 0
NAUSEA: 0
DIARRHEA: 0
POLYPHAGIA: 0
SHORTNESS OF BREATH: 0
DIZZINESS: 0
POLYDIPSIA: 0
FREQUENCY: 0
VOMITING: 0
NUMBNESS: 0
MYALGIAS: 0
ARTHRALGIAS: 0
FATIGUE: 0
WEAKNESS: 0
APPETITE CHANGE: 0

## 2024-10-28 ASSESSMENT — ACTIVITIES OF DAILY LIVING (ADL)
GROCERY_SHOPPING: INDEPENDENT
DOING_HOUSEWORK: INDEPENDENT
BATHING: INDEPENDENT
DRESSING: INDEPENDENT
MANAGING_FINANCES: INDEPENDENT
TAKING_MEDICATION: INDEPENDENT

## 2024-10-30 ENCOUNTER — TELEPHONE (OUTPATIENT)
Dept: PRIMARY CARE | Facility: CLINIC | Age: 84
End: 2024-10-30
Payer: MEDICARE

## 2024-11-06 ENCOUNTER — APPOINTMENT (OUTPATIENT)
Dept: CARDIOLOGY | Facility: CLINIC | Age: 84
End: 2024-11-06
Payer: MEDICARE

## 2024-11-07 ENCOUNTER — TELEPHONE (OUTPATIENT)
Dept: PRIMARY CARE | Facility: CLINIC | Age: 84
End: 2024-11-07
Payer: MEDICARE

## 2024-11-07 DIAGNOSIS — J06.9 UPPER RESPIRATORY TRACT INFECTION, UNSPECIFIED TYPE: Primary | ICD-10-CM

## 2024-11-07 RX ORDER — AZITHROMYCIN 250 MG/1
TABLET, FILM COATED ORAL
Qty: 6 TABLET | Refills: 0 | Status: SHIPPED | OUTPATIENT
Start: 2024-11-07 | End: 2024-11-12

## 2024-11-07 NOTE — TELEPHONE ENCOUNTER
Pt called and stated that she has had a cough and ST and congestion for 3 days.  Didn't test for Covid because she doesn't think it is.  Please advise

## 2024-11-13 ENCOUNTER — APPOINTMENT (OUTPATIENT)
Dept: CARDIOLOGY | Facility: CLINIC | Age: 84
End: 2024-11-13
Payer: MEDICARE

## 2024-11-20 ENCOUNTER — APPOINTMENT (OUTPATIENT)
Dept: CARDIOLOGY | Facility: CLINIC | Age: 84
End: 2024-11-20
Payer: MEDICARE

## 2024-11-20 VITALS
DIASTOLIC BLOOD PRESSURE: 78 MMHG | SYSTOLIC BLOOD PRESSURE: 127 MMHG | HEIGHT: 56 IN | WEIGHT: 138 LBS | HEART RATE: 92 BPM | BODY MASS INDEX: 31.05 KG/M2

## 2024-11-20 DIAGNOSIS — I48.21 PERMANENT ATRIAL FIBRILLATION (MULTI): Primary | ICD-10-CM

## 2024-11-20 DIAGNOSIS — I48.0 PAROXYSMAL ATRIAL FIBRILLATION (MULTI): ICD-10-CM

## 2024-11-20 DIAGNOSIS — R04.0 EPISTAXIS: ICD-10-CM

## 2024-11-20 DIAGNOSIS — Z79.01 ANTICOAGULANT LONG-TERM USE: ICD-10-CM

## 2024-11-20 PROCEDURE — 1159F MED LIST DOCD IN RCRD: CPT | Performed by: INTERNAL MEDICINE

## 2024-11-20 PROCEDURE — 1123F ACP DISCUSS/DSCN MKR DOCD: CPT | Performed by: INTERNAL MEDICINE

## 2024-11-20 PROCEDURE — 1036F TOBACCO NON-USER: CPT | Performed by: INTERNAL MEDICINE

## 2024-11-20 PROCEDURE — 99214 OFFICE O/P EST MOD 30 MIN: CPT | Performed by: INTERNAL MEDICINE

## 2024-11-20 RX ORDER — METOPROLOL SUCCINATE 25 MG/1
12.5 TABLET, EXTENDED RELEASE ORAL 2 TIMES DAILY
Qty: 90 TABLET | Refills: 1 | Status: SHIPPED | OUTPATIENT
Start: 2024-11-20

## 2024-11-20 NOTE — PROGRESS NOTES
Patient:  Malathi Reyes  YOB: 1940  MRN: [id number]       HPI:       Malathi Ryees is a 84 y.o. female who returns today for cardiac follow-up.  She is accompanied by her daughter.  She underwent initial evaluation in September 2018 for worsening fatigue and was noted to have underlying atrial fibrillation. A Holter monitor showed atrial fibrillation with an average heart rate of 103 bpm. Minimum heart rate was 67 beats minute with a maximal heart rate at 11 AM of 156 bpm. She was started on Toprol-XL 20 mg daily and Eliquis 5 mg twice daily. A Lexiscan myocardial perfusion study was negative for ischemia or infarction. An echocardiogram done October 2, 2018 showed normal LV systolic function with LV ejection fraction 55%. There was moderate biatrial enlargement. Valvular structure and function were normal. Estimated RVSP was 25 mmHg.      She underwent elective cardioversion on November 8, 2018. She converted to normal sinus rhythm but had recurrence of atrial fibrillation. She was given intravenous amiodarone and maintained normal sinus rhythm after a second shock was administered. She was discharged home on amiodarone 200 mg by mouth daily and Toprol-XL. She subsequently discontinued amiodarone and opted for a rate control strategy.      She continues to do well since her last visit.  She she was previously having some intermittent epistaxis from the right nostril.  She decreased her dose of Eliquis to 2.5 mg daily.  She sometimes skips a day completel at the time of her last visit I I advised her to take the medication as prescribed as she is otherwise at increased risk for thromboembolic stroke.  She was referred to ENT but states the epistaxis resolved.  She denies any chest pain or shortness breath. She denies any orthopnea, PND, or increasing peripheral edema. She denies any palpitations, lightheadedness, near-syncope, or syncope. She denies any fever, chills, or cough. She has not had any  bleeding related difficulties. Her blood pressures are well controlled.  She will continue on Toprol-XL.  She is anticoagulated with Eliquis.  I again advised her to take this at a dose of 2.5 mg twice daily.  Lab studies October 20, 2024 showed a normal CBC and CMP.  Hemoglobin A1c 4.6.  TSH normal.  Lipid profile May 1, 2024 showed a cholesterol 194 with HDL 54, , and triglycerides 144.  Other details as noted below.      The above portion of this note was dictated by me using voice recognition software. I personally performed the services described in the documentation. The scribe entering the documentation below was in my presence. I affirm that the information is both accurate and complete.      Objective:     Vitals:    11/20/24 1516   BP: 127/78   Pulse: 92       Wt Readings from Last 4 Encounters:   11/20/24 62.6 kg (138 lb)   10/28/24 62.1 kg (137 lb)   07/29/24 62.1 kg (137 lb)   05/01/24 62.4 kg (137 lb 9.6 oz)       Allergies:     Allergies   Allergen Reactions    Amoxicillin Rash    Codeine Hives and Rash        Medications:     Current Outpatient Medications   Medication Instructions    acetaminophen (Tylenol) 325 mg tablet 3 tablets, Every 8 hours    apixaban (ELIQUIS) 2.5 mg, oral, Daily    BEE POLLEN ORAL Daily    clindamycin (Cleocin HCL) 300 mg capsule Take 2 capsules 1 hour prior to dental appointment    metoprolol succinate XL (TOPROL-XL) 12.5 mg, oral, 2 times daily    triamcinolone (Kenalog) 0.1 % ointment Topical, 2 times daily PRN       Physical Examination:   GENERAL:  Well developed, well nourished, in no acute distress.  CHEST:  Symmetric and nontender.  NEURO/PSYCH:  Alert and oriented times three with approppriate behavior and responses.  NECK:  Supple, no JVD, no bruit.  LUNGS:  Clear to auscultation bilaterally, normal respiratory effort.  HEART:  Rate and rhythm irregularly irregular with no evident murmur, no gallop appreciated.        There are no rubs, clicks or  heaves.  EXTREMITIES:  Warm with good color, no clubbing or cyanosis.  There is no edema noted.  PERIPHERAL VASCULAR:  Pulses present and equally palpable; 2+ throughout.      Lab:     CBC:   Lab Results   Component Value Date    WBC 7.1 10/28/2024    RBC 4.57 10/28/2024    HGB 14.8 10/28/2024    HCT 44.7 10/28/2024     10/28/2024        CMP:    Lab Results   Component Value Date     10/28/2024    K 4.4 10/28/2024     10/28/2024    CO2 29 10/28/2024    BUN 9 10/28/2024    CREATININE 0.69 10/28/2024    GLUCOSE 96 10/28/2024    CALCIUM 9.7 10/28/2024       Lipid Profile:    Lab Results   Component Value Date    TRIG 144 05/01/2024    HDL 54.3 05/01/2024    LDLCALC 111 (H) 05/01/2024       BMP:  Lab Results   Component Value Date     10/28/2024     05/01/2024     09/06/2023     03/07/2023     12/14/2021    K 4.4 10/28/2024    K 4.0 05/01/2024    K 3.8 09/06/2023    K 4.1 03/07/2023    K 3.7 12/14/2021     10/28/2024     05/01/2024     09/06/2023     03/07/2023     12/14/2021    CO2 29 10/28/2024    CO2 30 05/01/2024    CO2 30 09/06/2023    CO2 30 03/07/2023    CO2 27 12/14/2021    BUN 9 10/28/2024    BUN 11 05/01/2024    BUN 10 09/06/2023    BUN 9 03/07/2023    BUN 8 12/14/2021    CREATININE 0.69 10/28/2024    CREATININE 0.71 05/01/2024    CREATININE 0.71 09/06/2023    CREATININE 0.68 03/07/2023    CREATININE 0.76 12/14/2021       CBC:  Lab Results   Component Value Date    WBC 7.1 10/28/2024    WBC 6.3 05/01/2024    WBC 7.0 09/06/2023    WBC 7.2 03/07/2023    WBC 14.5 (H) 12/14/2021    RBC 4.57 10/28/2024    RBC 4.36 05/01/2024    RBC 4.38 09/06/2023    RBC 4.41 03/07/2023    RBC 3.65 (L) 12/14/2021    HGB 14.8 10/28/2024    HGB 14.4 05/01/2024    HGB 14.3 09/06/2023    HGB 14.2 03/07/2023    HGB 11.7 (L) 12/14/2021    HCT 44.7 10/28/2024    HCT 42.3 05/01/2024    HCT 43.6 09/06/2023    HCT 43.6 03/07/2023    HCT 35.6 (L) 12/14/2021    MCV  98 10/28/2024    MCV 97 05/01/2024     09/06/2023    MCV 99 03/07/2023    MCV 98 12/14/2021    MCH 32.4 10/28/2024    MCH 33.0 05/01/2024    MCHC 33.1 10/28/2024    MCHC 34.0 05/01/2024    MCHC 32.8 09/06/2023    MCHC 32.6 03/07/2023    MCHC 32.9 12/14/2021    RDW 12.6 10/28/2024    RDW 12.6 05/01/2024    RDW 13.0 09/06/2023    RDW 12.3 03/07/2023    RDW 12.5 12/14/2021     10/28/2024     05/01/2024     09/06/2023     03/07/2023     (L) 12/14/2021       Hepatic Function Panel:    Lab Results   Component Value Date    ALKPHOS 61 10/28/2024    ALT 14 10/28/2024    AST 17 10/28/2024    PROT 6.9 10/28/2024    BILITOT 0.9 10/28/2024       HgBA1c:    Lab Results   Component Value Date    HGBA1C 4.8 10/28/2024       Magnesium:    Lab Results   Component Value Date    MG 2.26 09/26/2018       TSH:    Lab Results   Component Value Date    TSH 0.81 10/28/2024       PT/INR:    Lab Results   Component Value Date    PROTIME 12.0 12/06/2021    INR 1.0 12/06/2021       Problem List:     Patient Active Problem List   Diagnosis    Acute mid back pain    Fatigue    Inguinal hernia    Irregular heart beat    Kyphoscoliosis    Thoracic scoliosis    Left knee DJD    Lumbar pain    Lymphedema of extremity    Nausea in adult    Headache    Neck pain    Obese    Otitis externa, left    Pain in left knee    Paroxysmal atrial fibrillation (Multi)    Permanent atrial fibrillation (Multi)    Shortness of breath    Sinusitis    Status post left knee replacement    Tachycardia    Anticoagulant long-term use    Class 1 obesity with body mass index (BMI) of 31.0 to 31.9 in adult    Epistaxis       Asessment:     Problem List Items Addressed This Visit             ICD-10-CM    Permanent atrial fibrillation (Multi) - Primary I48.21    Anticoagulant long-term use Z79.01    Epistaxis R04.0

## 2024-12-06 DIAGNOSIS — Z96.652 STATUS POST TOTAL LEFT KNEE REPLACEMENT: Primary | ICD-10-CM

## 2024-12-09 NOTE — PROGRESS NOTES
No chief complaint on file.      The patient is here for follow-up of their side: left knee arthroplasty.  The patient has no knee pain.  The patient has no mechanical symptoms.  The patient has no swelling.  The patient is approximately 3 year(s) postop    Physical examination:    Examination of the side: left knee  The incision is healing well  No erythema or warmth.  No instability varus or valgus stressing the knee at 0, 30 or 60 degrees.  No instability in the AP plane at 90 degrees.  Range of motion: 0 degrees extension, 120 degrees flexion  There is no tenderness  Calf is soft, Homans negative  The patient has intact ankle dorsiflexion and plantarflexion.    Radiographs:   See dictated report      Impression:  Status post side: left total knee arthroplasty    Plan:  Discussed the importance of prophylactic dental antibiotics  Follow up in  1 year with XR   All questions answered

## 2024-12-10 ENCOUNTER — APPOINTMENT (OUTPATIENT)
Dept: ORTHOPEDIC SURGERY | Facility: CLINIC | Age: 84
End: 2024-12-10
Payer: MEDICARE

## 2024-12-10 ENCOUNTER — HOSPITAL ENCOUNTER (OUTPATIENT)
Dept: RADIOLOGY | Facility: HOSPITAL | Age: 84
Discharge: HOME | End: 2024-12-10
Payer: MEDICARE

## 2024-12-10 DIAGNOSIS — Z96.652 STATUS POST TOTAL LEFT KNEE REPLACEMENT: Primary | ICD-10-CM

## 2024-12-10 DIAGNOSIS — Z96.652 STATUS POST TOTAL LEFT KNEE REPLACEMENT: ICD-10-CM

## 2024-12-10 PROCEDURE — 73562 X-RAY EXAM OF KNEE 3: CPT | Mod: LEFT SIDE | Performed by: RADIOLOGY

## 2024-12-10 PROCEDURE — 1036F TOBACCO NON-USER: CPT | Performed by: ORTHOPAEDIC SURGERY

## 2024-12-10 PROCEDURE — 1123F ACP DISCUSS/DSCN MKR DOCD: CPT | Performed by: ORTHOPAEDIC SURGERY

## 2024-12-10 PROCEDURE — 99212 OFFICE O/P EST SF 10 MIN: CPT | Performed by: ORTHOPAEDIC SURGERY

## 2024-12-10 PROCEDURE — 73562 X-RAY EXAM OF KNEE 3: CPT | Mod: LT

## 2024-12-10 PROCEDURE — 1159F MED LIST DOCD IN RCRD: CPT | Performed by: ORTHOPAEDIC SURGERY

## 2025-03-21 ENCOUNTER — TELEPHONE (OUTPATIENT)
Dept: PRIMARY CARE | Facility: CLINIC | Age: 85
End: 2025-03-21
Payer: MEDICARE

## 2025-03-21 NOTE — TELEPHONE ENCOUNTER
Pt called and stated that she has a cough congestion and fatigue.  She feels horrible and can't sleep.    She has not done any home covid or flu test so I suggested she do that to start.  She doesn't really want to do that so I suggested the ml at [facility] but she said that's too far away and she might just go to the Urgent Care in [place].

## 2025-03-25 ENCOUNTER — OFFICE VISIT (OUTPATIENT)
Dept: URGENT CARE | Age: 85
End: 2025-03-25
Payer: MEDICARE

## 2025-03-25 VITALS
DIASTOLIC BLOOD PRESSURE: 81 MMHG | TEMPERATURE: 99.6 F | BODY MASS INDEX: 30.37 KG/M2 | HEART RATE: 94 BPM | HEIGHT: 56 IN | WEIGHT: 135 LBS | RESPIRATION RATE: 20 BRPM | SYSTOLIC BLOOD PRESSURE: 140 MMHG | OXYGEN SATURATION: 98 %

## 2025-03-25 DIAGNOSIS — H10.32 ACUTE BACTERIAL CONJUNCTIVITIS OF LEFT EYE: ICD-10-CM

## 2025-03-25 DIAGNOSIS — J06.9 UPPER RESPIRATORY TRACT INFECTION, UNSPECIFIED TYPE: Primary | ICD-10-CM

## 2025-03-25 PROCEDURE — 1159F MED LIST DOCD IN RCRD: CPT | Performed by: NURSE PRACTITIONER

## 2025-03-25 PROCEDURE — 99203 OFFICE O/P NEW LOW 30 MIN: CPT | Performed by: NURSE PRACTITIONER

## 2025-03-25 PROCEDURE — 1160F RVW MEDS BY RX/DR IN RCRD: CPT | Performed by: NURSE PRACTITIONER

## 2025-03-25 PROCEDURE — 1036F TOBACCO NON-USER: CPT | Performed by: NURSE PRACTITIONER

## 2025-03-25 PROCEDURE — 1123F ACP DISCUSS/DSCN MKR DOCD: CPT | Performed by: NURSE PRACTITIONER

## 2025-03-25 RX ORDER — DOXYCYCLINE HYCLATE 100 MG
100 TABLET ORAL 2 TIMES DAILY
Qty: 20 TABLET | Refills: 0 | Status: SHIPPED | OUTPATIENT
Start: 2025-03-25 | End: 2025-04-04

## 2025-03-25 RX ORDER — BENZONATATE 200 MG/1
200 CAPSULE ORAL 3 TIMES DAILY PRN
Qty: 30 CAPSULE | Refills: 0 | Status: SHIPPED | OUTPATIENT
Start: 2025-03-25 | End: 2025-04-01

## 2025-03-25 RX ORDER — ERYTHROMYCIN 5 MG/G
OINTMENT OPHTHALMIC 4 TIMES DAILY
Qty: 3.5 G | Refills: 0 | Status: SHIPPED | OUTPATIENT
Start: 2025-03-25 | End: 2025-04-01

## 2025-03-25 ASSESSMENT — ENCOUNTER SYMPTOMS
COUGH: 1
GASTROINTESTINAL NEGATIVE: 1
PSYCHIATRIC NEGATIVE: 1
EYE DISCHARGE: 1
NEUROLOGICAL NEGATIVE: 1
EYE REDNESS: 1
ENDOCRINE NEGATIVE: 1
MUSCULOSKELETAL NEGATIVE: 1
FATIGUE: 1

## 2025-03-25 NOTE — PROGRESS NOTES
Subjective   Patient ID: Malathi Reyes is a 84 y.o. female. They present today with a chief complaint of Cough (Dry irritating cough x 1 week/Left eye red and crusty x 1 day ).    History of Present Illness  84 year old female presents for URI symptoms x 1 week now with worsening dry cough. Left eye drainage/ erythema worsening as well. Denies chest pain/SOB.        Cough  Associated symptoms include eye redness and postnasal drip.       Past Medical History  Allergies as of 03/25/2025 - Reviewed 03/25/2025   Allergen Reaction Noted    Amoxicillin Rash 08/16/2023    Codeine Hives and Rash 08/16/2023       (Not in a hospital admission)       Past Medical History:   Diagnosis Date    Other conditions influencing health status 04/03/2014    No significant past medical history       Past Surgical History:   Procedure Laterality Date    OTHER SURGICAL HISTORY  01/13/2022    Anterior cruciate ligament repair    OTHER SURGICAL HISTORY  01/13/2022    Kidney surgery    OTHER SURGICAL HISTORY  01/13/2022    Bladder surgery    OTHER SURGICAL HISTORY  01/13/2022    Hernia repair    OTHER SURGICAL HISTORY  01/13/2022    Hysterectomy        reports that she has never smoked. She has never used smokeless tobacco. She reports that she does not drink alcohol and does not use drugs.    Review of Systems  Review of Systems   Constitutional:  Positive for fatigue.   HENT:  Positive for congestion and postnasal drip.    Eyes:  Positive for discharge and redness.   Respiratory:  Positive for cough.    Gastrointestinal: Negative.    Endocrine: Negative.    Genitourinary: Negative.    Musculoskeletal: Negative.    Neurological: Negative.    Psychiatric/Behavioral: Negative.                                    Objective    Vitals:    03/25/25 0811   BP: 140/81   BP Location: Right arm   Patient Position: Sitting   BP Cuff Size: Adult   Pulse: 94   Resp: 20   Temp: 37.6 °C (99.6 °F)   TempSrc: Temporal   SpO2: 98%   Weight: 61.2 kg (135 lb)  "  Height: 1.422 m (4' 8\")     No LMP recorded.    Physical Exam  Constitutional:       Appearance: Normal appearance.   HENT:      Head: Normocephalic and atraumatic.      Right Ear: Tympanic membrane normal.      Left Ear: Tympanic membrane normal.      Nose: Rhinorrhea present.      Mouth/Throat:      Pharynx: Posterior oropharyngeal erythema present.   Eyes:      General:         Left eye: Discharge present.     Pupils: Pupils are equal, round, and reactive to light.   Cardiovascular:      Rate and Rhythm: Normal rate.      Pulses: Normal pulses.   Pulmonary:      Effort: Pulmonary effort is normal.      Breath sounds: Normal breath sounds.   Skin:     General: Skin is warm and dry.   Neurological:      General: No focal deficit present.      Mental Status: She is alert and oriented to person, place, and time. Mental status is at baseline.   Psychiatric:         Mood and Affect: Mood normal.         Behavior: Behavior normal.         Thought Content: Thought content normal.         Judgment: Judgment normal.         Procedures    Point of Care Test & Imaging Results from this visit  No results found for this visit on 03/25/25.   No results found.    Diagnostic study results (if any) were reviewed by EVIE Lynch.    Assessment/Plan   Allergies, medications, history, and pertinent labs/EKGs/Imaging reviewed by EVIE Lynch.     Malathi Reyes is a 84 y.o. female who complains of congestion, sore throat, post nasal drip, and dry cough for 1 week. She denies a history of chest pain, shortness of breath, vomiting, and weight loss and does not a history of asthma.      Exam consistent with URI/conjunctivitis. Prescriptions for doxy/eye ointment sent.   Symptomatic therapy suggested: push fluids, rest, use vaporizer or mist prn, and return office visit prn if symptoms persist or worsen.     Risks, benefits, and alternatives of the medications and treatment plan prescribed today were " discussed, and patient expressed understanding. Plan follow up as discussed or as needed if any worsening symptoms or change in condition. Reinforced red flags including (but not limited to): severe or worsening pain; difficulty swallowing; stiff neck; shortness of breath; coughing or vomiting blood; chest pain; and new or increased fever are indications to go to the Emergency Department.  At time of discharge patient was clinically well-appearing and HDS for outpatient management. The patient and/or family was educated regarding diagnosis, supportive care, OTC and Rx medications. The patient and/or family was given the opportunity to ask questions prior to discharge.  They verbalized understanding of my discussion of the plans for treatment, expected course, indications to return to  or seek further evaluation in ED, and the need for timely follow up as directed.     Orders and Diagnoses  Diagnoses and all orders for this visit:  Upper respiratory tract infection, unspecified type  -     doxycycline (Vibra-Tabs) 100 mg tablet; Take 1 tablet (100 mg) by mouth 2 times a day for 10 days. Take with a full glass of water and do not lie down for at least 30 minutes after.  -     benzonatate (Tessalon) 200 mg capsule; Take 1 capsule (200 mg) by mouth 3 times a day as needed for cough for up to 7 days. Do not crush or chew.  Acute bacterial conjunctivitis of left eye  -     erythromycin (Romycin) 5 mg/gram (0.5 %) ophthalmic ointment; Apply to affected eye(s) 4 times a day for 7 days. Apply Amount per Dose: 0.5 inch (~1 cm) per dose.      Medical Admin Record      Patient disposition: Home    Electronically signed by EVIE Lynch  8:41 AM

## 2025-03-27 ENCOUNTER — PATIENT MESSAGE (OUTPATIENT)
Dept: PRIMARY CARE | Facility: CLINIC | Age: 85
End: 2025-03-27
Payer: MEDICARE

## 2025-03-27 DIAGNOSIS — R05.1 ACUTE COUGH: Primary | ICD-10-CM

## 2025-03-27 RX ORDER — METHYLPREDNISOLONE 4 MG/1
TABLET ORAL
Qty: 21 TABLET | Refills: 0 | Status: SHIPPED | OUTPATIENT
Start: 2025-03-27 | End: 2025-04-02

## 2025-04-16 ENCOUNTER — OFFICE VISIT (OUTPATIENT)
Dept: URGENT CARE | Age: 85
End: 2025-04-16
Payer: MEDICARE

## 2025-04-16 VITALS
TEMPERATURE: 98 F | BODY MASS INDEX: 30.37 KG/M2 | RESPIRATION RATE: 18 BRPM | HEART RATE: 103 BPM | WEIGHT: 135 LBS | SYSTOLIC BLOOD PRESSURE: 146 MMHG | OXYGEN SATURATION: 99 % | DIASTOLIC BLOOD PRESSURE: 96 MMHG | HEIGHT: 56 IN

## 2025-04-16 DIAGNOSIS — R05.1 ACUTE COUGH: Primary | ICD-10-CM

## 2025-04-16 DIAGNOSIS — B34.8 RHINOVIRUS: Primary | ICD-10-CM

## 2025-04-16 LAB
POC CORONAVIRUS SARS-COV-2 PCR: NEGATIVE
POC HUMAN RHINOVIRUS PCR: POSITIVE
POC INFLUENZA A VIRUS PCR: NEGATIVE
POC INFLUENZA B VIRUS PCR: NEGATIVE
POC RESPIRATORY SYNCYTIAL VIRUS PCR: NEGATIVE

## 2025-04-16 PROCEDURE — 1123F ACP DISCUSS/DSCN MKR DOCD: CPT | Performed by: NURSE PRACTITIONER

## 2025-04-16 PROCEDURE — 87631 RESP VIRUS 3-5 TARGETS: CPT | Performed by: NURSE PRACTITIONER

## 2025-04-16 PROCEDURE — 1036F TOBACCO NON-USER: CPT | Performed by: NURSE PRACTITIONER

## 2025-04-16 PROCEDURE — 1160F RVW MEDS BY RX/DR IN RCRD: CPT | Performed by: NURSE PRACTITIONER

## 2025-04-16 PROCEDURE — 99213 OFFICE O/P EST LOW 20 MIN: CPT | Performed by: NURSE PRACTITIONER

## 2025-04-16 PROCEDURE — 1159F MED LIST DOCD IN RCRD: CPT | Performed by: NURSE PRACTITIONER

## 2025-04-16 ASSESSMENT — PATIENT HEALTH QUESTIONNAIRE - PHQ9
2. FEELING DOWN, DEPRESSED OR HOPELESS: NOT AT ALL
1. LITTLE INTEREST OR PLEASURE IN DOING THINGS: NOT AT ALL
SUM OF ALL RESPONSES TO PHQ9 QUESTIONS 1 & 2: 0

## 2025-04-16 ASSESSMENT — ENCOUNTER SYMPTOMS
RHINORRHEA: 1
SINUS PAIN: 0
SHORTNESS OF BREATH: 0
COUGH: 1
CHILLS: 0
FEVER: 0
SORE THROAT: 0
WHEEZING: 0
SINUS PRESSURE: 0
DEPRESSION: 0

## 2025-04-16 NOTE — PROGRESS NOTES
Subjective   Patient ID: Malathi Reyes is a 84 y.o. female. They present today with a chief complaint of cough.    Patient disposition: Home    HISTORY OF PRESENT ILLNESS:    Video visit completed with realtime synchronous video/audio connection. Informed consent was obtained from the patient. Patient was made aware that my evaluation and diagnosis are limited due to the fact that we are not in the same room during the interview and that this is a virtual encounter that took place via videoconferencing. Patient verbalized understanding.     I have communicated my name and active licensure information to the patient. The patient's identity and physical location were verified at the time of this visit. Either the patient or their legal representative were informed of the risks and benefits of, and alternatives to, treatment through a remote evaluation. The patient consented to proceed with the evaluation remotely. This remote visit was conducted using video and audio.    This is an adult female of advanced age. She is a lifetime nonsmoker without pulmonary history. She presents by video chat for cough, dry, for 2 days. Admits congestion and malaise as well. Denies known sick contacts. Denies CP, palpitations, dizziness/lightheadedness. She was seen at [facility] by a different provider 3 weeks ago and Rxd doxycycline, Tessalon. She states she felt better and had no symptoms until 2 days ago, and feels like it came back.    Past Medical History  Allergies as of 04/16/2025 - Reviewed 03/25/2025   Allergen Reaction Noted    Amoxicillin Rash 08/16/2023    Codeine Hives and Rash 08/16/2023       Prescriptions Prior to Admission[1]     Medical History[2]    Surgical History[3]     reports that she has never smoked. She has never used smokeless tobacco. She reports that she does not drink alcohol and does not use drugs.    Review of Systems    Negative except as documented in the History of Present Illness.                              Objective    There were no vitals filed for this visit.  No LMP recorded.      PHYSICAL EXAMINATION:    Physical examination limited by video chat format for the visit.    CONSTITUTIONAL: well-appearing, nontoxic, pleasant frail older adult female    EYES:   No scleral icterus or orbital trauma noted. No conjunctival injection.     ENT:  Head and face are unremarkable and atraumatic. Mucous membranes moist.     LUNGS:  No respiratory distress noted. No coughing noted.    CARDIOVASCULAR:   Well-perfused.    SKIN:   Good color, with no significant rashes, pallor, cyanosis, wounds on portions visible on video chat.    NEURO:  Normal baseline mental status. .    PSYCH: Appropriate mood and affect.         ---------------------------------------------------------------         MDM:  I discussed with the patient my concerns that lower respiratory infection symptoms in an 84-year-old require vital signs check and formal in-person evaluation. She will go to the [facility] location for an in-person visit driven by her daughter today. She had no emergent sx requiring EMS transport to the ED based on the virtual interview today.        Procedures    Diagnostic study results (if any) were reviewed by Tyrell Jimenez PA-C.    No results found for this visit on 04/16/25.     Assessment/Plan   Allergies, medications, history, and pertinent labs/EKGs/Imaging reviewed by Tyrell Jimenez PA-C.     Orders and Diagnoses  There are no diagnoses linked to this encounter.    Medical Admin Record      Follow Up Instructions  No follow-ups on file.    Electronically signed by Tyrell Jimenez PA-C  3:32 PM         [1] (Not in a hospital admission)   [2]   Past Medical History:  Diagnosis Date    Other conditions influencing health status 04/03/2014    No significant past medical history   [3]   Past Surgical History:  Procedure Laterality Date    OTHER SURGICAL HISTORY  01/13/2022    Anterior cruciate ligament repair    OTHER SURGICAL  HISTORY  01/13/2022    Kidney surgery    OTHER SURGICAL HISTORY  01/13/2022    Bladder surgery    OTHER SURGICAL HISTORY  01/13/2022    Hernia repair    OTHER SURGICAL HISTORY  01/13/2022    Hysterectomy

## 2025-04-16 NOTE — PROGRESS NOTES
"Subjective   Patient ID: Malathi Reyes is a 84 y.o. female. They present today with a chief complaint of Cough (Started 3 days ago ), Nasal Congestion, and Generalized Body Aches.    History of Present Illness  HPI    Patient presents for congestion and cough. Symptoms started 2 days ago. She was sick 3 weeks ago and was on Doxycycline and symptoms improved until 2 days ago. Also with PND and ear pressure. No fever/chills. No CP, SOB, N/V/D. She has been trying homeopathic remedies at home with no relief.    Past Medical History  Allergies as of 04/16/2025 - Reviewed 04/16/2025   Allergen Reaction Noted    Amoxicillin Rash 08/16/2023    Codeine Hives and Rash 08/16/2023       Prescriptions Prior to Admission[1]     Medical History[2]    Surgical History[3]     reports that she has never smoked. She has never been exposed to tobacco smoke. She has never used smokeless tobacco. She reports that she does not drink alcohol and does not use drugs.    Review of Systems  Review of Systems   Constitutional:  Negative for chills and fever.   HENT:  Positive for congestion, postnasal drip and rhinorrhea. Negative for ear pain, sinus pressure, sinus pain and sore throat.    Respiratory:  Positive for cough. Negative for shortness of breath and wheezing.            Objective    Vitals:    04/16/25 1625   BP: (!) 146/96   BP Location: Left arm   Patient Position: Sitting   Pulse: 103   Resp: 18   Temp: 36.7 °C (98 °F)   SpO2: 99%   Weight: 61.2 kg (135 lb)   Height: 1.422 m (4' 8\")     No LMP recorded. Patient has had a hysterectomy.    Physical Exam  Constitutional:       General: She is not in acute distress.     Appearance: Normal appearance. She is not ill-appearing or toxic-appearing.   HENT:      Head: Normocephalic and atraumatic.      Right Ear: Hearing and external ear normal. A middle ear effusion is present. Tympanic membrane is not erythematous or bulging.      Left Ear: Hearing and external ear normal. A middle ear " effusion is present. Tympanic membrane is not erythematous or bulging.      Nose: Congestion and rhinorrhea present.      Mouth/Throat:      Lips: Pink.      Mouth: Mucous membranes are moist.      Pharynx: No pharyngeal swelling, oropharyngeal exudate or posterior oropharyngeal erythema.   Cardiovascular:      Rate and Rhythm: Normal rate.      Heart sounds: Normal heart sounds.   Pulmonary:      Effort: Pulmonary effort is normal. No respiratory distress.      Breath sounds: Normal breath sounds.   Skin:     General: Skin is warm and dry.   Neurological:      General: No focal deficit present.      Mental Status: She is alert.   Psychiatric:         Attention and Perception: Attention normal.         Mood and Affect: Mood normal.         Speech: Speech normal.         Behavior: Behavior normal.         Procedures    Point of Care Test & Imaging Results from this visit  No results found for this visit on 04/16/25.   Imaging  No results found.    Cardiology, Vascular, and Other Imaging  No other imaging results found for the past 2 days      Diagnostic study results (if any) were reviewed by EVIE Garcia.    Assessment/Plan   Allergies, medications, history, and pertinent labs/EKGs/Imaging reviewed by EVIE Garcia.     Medical Decision Making  Patient positive for Rhinovirus in clinic today. VSS, lungs clear. Advised to continue conservative management at this time.   At time of discharge patient was clinically well-appearing and HDS for outpatient management. She was educated regarding diagnosis, supportive care, OTC and Rx medications. She was given the opportunity to ask questions prior to discharge.  They verbalized understanding of my discussion of the plans for treatment, expected course, indications to return to  or seek further evaluation in ED, and the need for timely follow up as directed.         Orders and Diagnoses  Diagnoses and all orders for this  visit:  Rhinovirus      Medical Admin Record      Patient disposition: Home    Electronically signed by EVIE Garcia  5:26 PM           [1] (Not in a hospital admission)   [2]   Past Medical History:  Diagnosis Date    Other conditions influencing health status 04/03/2014    No significant past medical history   [3]   Past Surgical History:  Procedure Laterality Date    OTHER SURGICAL HISTORY  01/13/2022    Anterior cruciate ligament repair    OTHER SURGICAL HISTORY  01/13/2022    Kidney surgery    OTHER SURGICAL HISTORY  01/13/2022    Bladder surgery    OTHER SURGICAL HISTORY  01/13/2022    Hernia repair    OTHER SURGICAL HISTORY  01/13/2022    Hysterectomy

## 2025-04-23 ENCOUNTER — TELEMEDICINE (OUTPATIENT)
Dept: URGENT CARE | Age: 85
End: 2025-04-23
Payer: MEDICARE

## 2025-04-23 ENCOUNTER — APPOINTMENT (OUTPATIENT)
Dept: URGENT CARE | Age: 85
End: 2025-04-23
Payer: MEDICARE

## 2025-04-23 DIAGNOSIS — J01.10 ACUTE NON-RECURRENT FRONTAL SINUSITIS: Primary | ICD-10-CM

## 2025-04-23 PROCEDURE — 1160F RVW MEDS BY RX/DR IN RCRD: CPT | Performed by: PHYSICIAN ASSISTANT

## 2025-04-23 PROCEDURE — 1159F MED LIST DOCD IN RCRD: CPT | Performed by: PHYSICIAN ASSISTANT

## 2025-04-23 PROCEDURE — 1123F ACP DISCUSS/DSCN MKR DOCD: CPT | Performed by: PHYSICIAN ASSISTANT

## 2025-04-23 PROCEDURE — 99213 OFFICE O/P EST LOW 20 MIN: CPT | Performed by: PHYSICIAN ASSISTANT

## 2025-04-23 RX ORDER — METHYLPREDNISOLONE 4 MG/1
TABLET ORAL
Qty: 21 TABLET | Refills: 0 | Status: SHIPPED | OUTPATIENT
Start: 2025-04-23 | End: 2025-04-29

## 2025-04-23 RX ORDER — AZITHROMYCIN 250 MG/1
250 TABLET, FILM COATED ORAL DAILY
Qty: 6 TABLET | Refills: 0 | Status: SHIPPED | OUTPATIENT
Start: 2025-04-23 | End: 2025-04-29

## 2025-04-23 ASSESSMENT — ENCOUNTER SYMPTOMS
COUGH: 1
RHINORRHEA: 1
CHEST TIGHTNESS: 0
FATIGUE: 1
FEVER: 0
HEADACHES: 1
WHEEZING: 0
SHORTNESS OF BREATH: 0

## 2025-04-23 NOTE — PROGRESS NOTES
Urgent Care Virtual Video Visit    Patient Location: Patient's home  Provider Location: [facility]    I have communicated my name and active licensure. Telephone visit completed with realtime synchronous audio connection. Informed consent was obtained from the patient. Patient was made aware that my evaluation and diagnosis are limited due to the fact that we are not in the same room during the interview and that this is a virtual encounter that took place via telephone. Patient verbalized understanding.         Patient presents via telephone as she was unable to connect via telemedicine for what she believes is a sinus infection.  Patient was seen on April 16 at an urgent care and diagnosed with rhinovirus.  She states that since that time she has had a lot of frontal sinus headache and pressure, thick nasal drainage with purulent nasal discharge.  She states she still has a cough but notes it is improved.  She denies any fevers, chest tightness, wheezing, shortness of breath.  She states that she has not had any ear pain or current sore throat.  She has been taking over-the-counter allergy medicine.  She states she is very fatigued and in pain due to her scoliosis and is hopeful for another Medrol taper as it helped her feel better when she was sick last month for this.  Patient's med list states that she takes Eliquis for her atrial fibrillation though patient states she actually does not take this because it causes nosebleeds.  She insist that she needs Medrol and a Z-Kelechi.      Cough  The current episode started 1 to 4 weeks ago. The problem has been gradually worsening. The problem occurs every few minutes. The cough is Non-productive and productive of sputum. Associated symptoms include ear congestion, headaches, nasal congestion, postnasal drip and rhinorrhea. Pertinent negatives include no chest pain, ear pain, fever, shortness of breath or wheezing. The symptoms are aggravated by lying down.       Review of Systems   Constitutional:  Positive for fatigue. Negative for fever.   HENT:  Positive for congestion, postnasal drip and rhinorrhea. Negative for ear pain.    Respiratory:  Positive for cough. Negative for chest tightness, shortness of breath and wheezing.    Cardiovascular:  Negative for chest pain.   Neurological:  Positive for headaches.     Physical Exam  Constitutional:       Comments: Exam is limited by the nature of a telephone visit.  Patient is able to speak in full sentences without any sounds of respiratory distress.  She does not cough during her visit.  She answers my questions appropriately.  Otherwise I am unable to perform any physical exam on this patient.          Assessment/Plan    Patient presents history and symptoms consistent with possible sinusitis. Given length of symptoms and lack of improvement with OTC symptomatic treatment, it seemed reasonable to treat with course of antibiotics.  Patient was insistent on a Z-Kelechi and a Medrol taper, this was provided.  Patient informs me that she does not take Eliquis as we did discuss both steroids and azithromycin can increase the serum concentration of Eliquis.  She was encouraged to schedule follow-up appointment with her primary care provider for reevaluation within 1 week if her symptoms did not improve.  We did discuss ER precautions in the interim with any acute worsening.  Patient verbalized understanding and was agreeable this plan.    Patient disposition: Home    Electronically signed by [facility]  10:33 AM

## 2025-05-02 DIAGNOSIS — I48.0 PAROXYSMAL ATRIAL FIBRILLATION (MULTI): ICD-10-CM

## 2025-05-02 RX ORDER — METOPROLOL SUCCINATE 25 MG/1
12.5 TABLET, EXTENDED RELEASE ORAL 2 TIMES DAILY
Qty: 90 TABLET | Refills: 0 | Status: SHIPPED | OUTPATIENT
Start: 2025-05-02

## 2025-05-02 NOTE — TELEPHONE ENCOUNTER
Rec'd call from patient requesting refill of Metoprolol Succinate 25 mg 1/2 tablet bid to [facility].    Short term script e-scribed as requested. Pt has pending appt 5/21/25 with Dr. Mendoza.

## 2025-05-21 ENCOUNTER — APPOINTMENT (OUTPATIENT)
Dept: CARDIOLOGY | Facility: CLINIC | Age: 85
End: 2025-05-21
Payer: MEDICARE

## 2025-06-04 ENCOUNTER — APPOINTMENT (OUTPATIENT)
Dept: CARDIOLOGY | Facility: CLINIC | Age: 85
End: 2025-06-04
Payer: MEDICARE

## 2025-07-02 ENCOUNTER — APPOINTMENT (OUTPATIENT)
Dept: CARDIOLOGY | Facility: CLINIC | Age: 85
End: 2025-07-02
Payer: MEDICARE

## 2025-07-02 VITALS
HEART RATE: 73 BPM | WEIGHT: 132 LBS | HEIGHT: 56 IN | SYSTOLIC BLOOD PRESSURE: 116 MMHG | BODY MASS INDEX: 29.69 KG/M2 | DIASTOLIC BLOOD PRESSURE: 74 MMHG

## 2025-07-02 DIAGNOSIS — I48.0 PAROXYSMAL ATRIAL FIBRILLATION (MULTI): ICD-10-CM

## 2025-07-02 DIAGNOSIS — R04.0 EPISTAXIS: ICD-10-CM

## 2025-07-02 DIAGNOSIS — Z79.01 ANTICOAGULANT LONG-TERM USE: ICD-10-CM

## 2025-07-02 DIAGNOSIS — I48.21 PERMANENT ATRIAL FIBRILLATION (MULTI): ICD-10-CM

## 2025-07-02 PROCEDURE — 1159F MED LIST DOCD IN RCRD: CPT | Performed by: INTERNAL MEDICINE

## 2025-07-02 PROCEDURE — 99214 OFFICE O/P EST MOD 30 MIN: CPT | Performed by: INTERNAL MEDICINE

## 2025-07-02 PROCEDURE — 1036F TOBACCO NON-USER: CPT | Performed by: INTERNAL MEDICINE

## 2025-07-02 RX ORDER — METOPROLOL SUCCINATE 25 MG/1
12.5 TABLET, EXTENDED RELEASE ORAL 2 TIMES DAILY
Qty: 90 TABLET | Refills: 3 | Status: SHIPPED | OUTPATIENT
Start: 2025-07-02

## 2025-07-02 NOTE — PATIENT INSTRUCTIONS
Follow up 6 months     Fasting labs to be done in October just before you see Dr. Sharif.   We are a paperless office.  The order is in the computer and you can go to any  lab to have done.   [facility] strongly recommends you make an appointment ahead of time online [email or web address])  or via the [email or web address] before visiting a [facility] patient service center. You may also call [phone]. You can always ask for order to be printed if you'd like to go to outside lab.

## 2025-07-02 NOTE — PROGRESS NOTES
Patient:  Malathi Reyes  YOB: 1940  MRN: [id number]       Chief Complaint   Patient presents with    Follow-up     6 month follow up       HPI:      Malathi Reyes is a 84 y.o. female who returns today for cardiac follow-up.  She is accompanied by her daughter.  She underwent initial evaluation in September 2018 for worsening fatigue and was noted to have underlying atrial fibrillation. A Holter monitor showed atrial fibrillation with an average heart rate of 103 bpm. Minimum heart rate was 67 beats minute with a maximal heart rate at 11 AM of 156 bpm. She was started on Toprol-XL 20 mg daily and Eliquis 5 mg twice daily. A Lexiscan myocardial perfusion study was negative for ischemia or infarction. An echocardiogram done October 2, 2018 showed normal LV systolic function with LV ejection fraction 55%. There was moderate biatrial enlargement. Valvular structure and function were normal. Estimated RVSP was 25 mmHg.     She underwent elective cardioversion on November 8, 2018. She converted to normal sinus rhythm but had recurrence of atrial fibrillation. She was given intravenous amiodarone and maintained normal sinus rhythm after a second shock was administered. She was discharged home on amiodarone 200 mg by mouth daily and Toprol-XL. She subsequently discontinued amiodarone and opted for a rate control strategy.  She was previously having some intermittent epistaxis from the right nostril.  She decreased her dose of Eliquis to 2.5 mg daily.  She sometimes skips a day completely.  She has been advised to take the Eliquis as prescribed as she is otherwise at increased risk for thromboembolic stroke.  She was referred to ENT but reported that the epistaxis resolved.      She continues to do well since her last visit. She denies any chest pain or shortness breath. She denies any orthopnea, PND, or increasing peripheral edema. She denies any palpitations, lightheadedness, near-syncope, or syncope. She  denies any fever, chills, or cough. She has not had any bleeding related difficulties. Her blood pressures are well controlled.  She will continue on Toprol-XL.  Continue anticoagulation with Eliquis.   Lab studies October 28, 2024 showed a normal CBC and CMP.  Hemoglobin A1c 4.6.  TSH normal.  Lipid profile May 1, 2024 showed a cholesterol 194 with HDL 54, , and triglycerides 144.  Other details as noted below.       Objective:     Vitals:    07/02/25 1610   BP: 116/74   Pulse: 73       Wt Readings from Last 4 Encounters:   04/16/25 61.2 kg (135 lb)   03/25/25 61.2 kg (135 lb)   11/20/24 62.6 kg (138 lb)   10/28/24 62.1 kg (137 lb)       Allergies:     Allergies   Allergen Reactions    Amoxicillin Rash    Codeine Hives and Rash        Medications:     Current Outpatient Medications   Medication Instructions    acetaminophen (Tylenol) 325 mg tablet 3 tablets, Every 8 hours    apixaban (ELIQUIS) 2.5 mg, oral, 2 times daily    BEE POLLEN ORAL Daily    metoprolol succinate XL (TOPROL-XL) 12.5 mg, oral, 2 times daily       Physical Examination:   GENERAL:  Well developed, well nourished, in no acute distress.  CHEST:  Symmetric and nontender.  NEURO/PSYCH:  Alert and oriented times three with approppriate behavior and responses.  NECK:  Supple, no JVD, no bruit.  LUNGS:  Clear to auscultation bilaterally, normal respiratory effort.  HEART:  Rate and rhythm irregularly irregular with no evident murmur, no gallop appreciated.        There are no rubs, clicks or heaves.  EXTREMITIES:  Warm with good color, no clubbing or cyanosis.  There is no edema noted.  PERIPHERAL VASCULAR:  Pulses present and equally palpable; 2+ throughout.      Lab:     CBC:   Lab Results   Component Value Date    WBC 7.1 10/28/2024    RBC 4.57 10/28/2024    HGB 14.8 10/28/2024    HCT 44.7 10/28/2024     10/28/2024        CMP:    Lab Results   Component Value Date     10/28/2024    K 4.4 10/28/2024     10/28/2024    CO2 29  10/28/2024    BUN 9 10/28/2024    CREATININE 0.69 10/28/2024    GLUCOSE 96 10/28/2024    CALCIUM 9.7 10/28/2024       Lipid Profile:    Lab Results   Component Value Date    TRIG 144 05/01/2024    HDL 54.3 05/01/2024    LDLCALC 111 (H) 05/01/2024       BMP:  Lab Results   Component Value Date     10/28/2024     05/01/2024     09/06/2023     03/07/2023     12/14/2021    K 4.4 10/28/2024    K 4.0 05/01/2024    K 3.8 09/06/2023    K 4.1 03/07/2023    K 3.7 12/14/2021     10/28/2024     05/01/2024     09/06/2023     03/07/2023     12/14/2021    CO2 29 10/28/2024    CO2 30 05/01/2024    CO2 30 09/06/2023    CO2 30 03/07/2023    CO2 27 12/14/2021    BUN 9 10/28/2024    BUN 11 05/01/2024    BUN 10 09/06/2023    BUN 9 03/07/2023    BUN 8 12/14/2021    CREATININE 0.69 10/28/2024    CREATININE 0.71 05/01/2024    CREATININE 0.71 09/06/2023    CREATININE 0.68 03/07/2023    CREATININE 0.76 12/14/2021       CBC:  Lab Results   Component Value Date    WBC 7.1 10/28/2024    WBC 6.3 05/01/2024    WBC 7.0 09/06/2023    WBC 7.2 03/07/2023    WBC 14.5 (H) 12/14/2021    RBC 4.57 10/28/2024    RBC 4.36 05/01/2024    RBC 4.38 09/06/2023    RBC 4.41 03/07/2023    RBC 3.65 (L) 12/14/2021    HGB 14.8 10/28/2024    HGB 14.4 05/01/2024    HGB 14.3 09/06/2023    HGB 14.2 03/07/2023    HGB 11.7 (L) 12/14/2021    HCT 44.7 10/28/2024    HCT 42.3 05/01/2024    HCT 43.6 09/06/2023    HCT 43.6 03/07/2023    HCT 35.6 (L) 12/14/2021    MCV 98 10/28/2024    MCV 97 05/01/2024     09/06/2023    MCV 99 03/07/2023    MCV 98 12/14/2021    MCH 32.4 10/28/2024    MCH 33.0 05/01/2024    MCHC 33.1 10/28/2024    MCHC 34.0 05/01/2024    MCHC 32.8 09/06/2023    MCHC 32.6 03/07/2023    MCHC 32.9 12/14/2021    RDW 12.6 10/28/2024    RDW 12.6 05/01/2024    RDW 13.0 09/06/2023    RDW 12.3 03/07/2023    RDW 12.5 12/14/2021     10/28/2024     05/01/2024     09/06/2023      03/07/2023     (L) 12/14/2021       Hepatic Function Panel:    Lab Results   Component Value Date    ALKPHOS 61 10/28/2024    ALT 14 10/28/2024    AST 17 10/28/2024    PROT 6.9 10/28/2024    BILITOT 0.9 10/28/2024       HgBA1c:    Lab Results   Component Value Date    HGBA1C 4.8 10/28/2024       Magnesium:    Lab Results   Component Value Date    MG 2.26 09/26/2018       TSH:    Lab Results   Component Value Date    TSH 0.81 10/28/2024       PT/INR:    Lab Results   Component Value Date    PROTIME 12.0 12/06/2021    INR 1.0 12/06/2021       Problem List:     Patient Active Problem List   Diagnosis    Acute mid back pain    Fatigue    Inguinal hernia    Irregular heart beat    Kyphoscoliosis    Thoracic scoliosis    Left knee DJD    Lumbar pain    Lymphedema of extremity    Nausea in adult    Headache    Neck pain    Obese    Otitis externa, left    Pain in left knee    Paroxysmal atrial fibrillation (Multi)    Permanent atrial fibrillation (Multi)    Shortness of breath    Sinusitis    Status post left knee replacement    Tachycardia    Anticoagulant long-term use    Class 1 obesity with body mass index (BMI) of 31.0 to 31.9 in adult    Epistaxis       Asessment:     Problem List Items Addressed This Visit           ICD-10-CM    Paroxysmal atrial fibrillation (Multi) I48.0    Relevant Medications    metoprolol succinate XL (Toprol-XL) 25 mg 24 hr tablet    Other Relevant Orders    Follow Up In Cardiology    Comprehensive Metabolic Panel    CBC    Permanent atrial fibrillation (Multi) I48.21    Relevant Medications    metoprolol succinate XL (Toprol-XL) 25 mg 24 hr tablet    Other Relevant Orders    Follow Up In Cardiology    Comprehensive Metabolic Panel    CBC    Anticoagulant long-term use Z79.01    Relevant Orders    Follow Up In Cardiology    Comprehensive Metabolic Panel    CBC    Epistaxis R04.0    Relevant Orders    CBC       Provider Attestation - Scribe documentation    The above portion of this note  was dictated by me using voice recognition software.  All medical record entries made by the scribe were at my direction.  The scribe entering the documentation was in my presence.   I have reviewed the chart and agree that the record accurately reflects my personal performance of the history, physical exam, discussion and plan.

## 2025-10-30 ENCOUNTER — APPOINTMENT (OUTPATIENT)
Dept: PRIMARY CARE | Facility: CLINIC | Age: 85
End: 2025-10-30
Payer: MEDICARE

## 2025-12-02 ENCOUNTER — APPOINTMENT (OUTPATIENT)
Dept: ORTHOPEDIC SURGERY | Facility: CLINIC | Age: 85
End: 2025-12-02
Payer: MEDICARE

## 2026-01-07 ENCOUNTER — APPOINTMENT (OUTPATIENT)
Dept: CARDIOLOGY | Facility: CLINIC | Age: 86
End: 2026-01-07
Payer: MEDICARE